# Patient Record
Sex: MALE | Race: WHITE | NOT HISPANIC OR LATINO | Employment: STUDENT | URBAN - METROPOLITAN AREA
[De-identification: names, ages, dates, MRNs, and addresses within clinical notes are randomized per-mention and may not be internally consistent; named-entity substitution may affect disease eponyms.]

---
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
## 2023-10-24 ENCOUNTER — HOSPITAL ENCOUNTER (EMERGENCY)
Facility: HOSPITAL | Age: 18
End: 2023-10-24
Attending: EMERGENCY MEDICINE
Payer: COMMERCIAL

## 2023-10-24 ENCOUNTER — TELEPHONE (OUTPATIENT)
Age: 18
End: 2023-10-24

## 2023-10-24 VITALS
OXYGEN SATURATION: 99 % | DIASTOLIC BLOOD PRESSURE: 69 MMHG | TEMPERATURE: 98 F | BODY MASS INDEX: 22.5 KG/M2 | WEIGHT: 152.34 LBS | HEART RATE: 74 BPM | RESPIRATION RATE: 16 BRPM | SYSTOLIC BLOOD PRESSURE: 122 MMHG

## 2023-10-24 DIAGNOSIS — F32.A DEPRESSION: Primary | ICD-10-CM

## 2023-10-24 LAB
ALBUMIN SERPL BCP-MCNC: 4.9 G/DL (ref 3.5–5)
ALP SERPL-CCNC: 71 U/L (ref 34–104)
ALT SERPL W P-5'-P-CCNC: 9 U/L (ref 7–52)
AMPHETAMINES SERPL QL SCN: NEGATIVE
ANION GAP SERPL CALCULATED.3IONS-SCNC: 7 MMOL/L
AST SERPL W P-5'-P-CCNC: 16 U/L (ref 13–39)
BARBITURATES UR QL: NEGATIVE
BASOPHILS # BLD AUTO: 0.04 THOUSANDS/ÂΜL (ref 0–0.1)
BASOPHILS NFR BLD AUTO: 1 % (ref 0–1)
BENZODIAZ UR QL: POSITIVE
BILIRUB SERPL-MCNC: 1.16 MG/DL (ref 0.2–1)
BUN SERPL-MCNC: 20 MG/DL (ref 5–25)
CALCIUM SERPL-MCNC: 9.8 MG/DL (ref 8.4–10.2)
CHLORIDE SERPL-SCNC: 101 MMOL/L (ref 96–108)
CO2 SERPL-SCNC: 28 MMOL/L (ref 21–32)
COCAINE UR QL: NEGATIVE
CREAT SERPL-MCNC: 0.98 MG/DL (ref 0.6–1.3)
EOSINOPHIL # BLD AUTO: 0.01 THOUSAND/ÂΜL (ref 0–0.61)
EOSINOPHIL NFR BLD AUTO: 0 % (ref 0–6)
ERYTHROCYTE [DISTWIDTH] IN BLOOD BY AUTOMATED COUNT: 13.2 % (ref 11.6–15.1)
ETHANOL EXG-MCNC: 0 MG/DL
GFR SERPL CREATININE-BSD FRML MDRD: 111 ML/MIN/1.73SQ M
GLUCOSE SERPL-MCNC: 108 MG/DL (ref 65–140)
HCT VFR BLD AUTO: 43.7 % (ref 36.5–49.3)
HGB BLD-MCNC: 14.8 G/DL (ref 12–17)
IMM GRANULOCYTES # BLD AUTO: 0.02 THOUSAND/UL (ref 0–0.2)
IMM GRANULOCYTES NFR BLD AUTO: 0 % (ref 0–2)
LYMPHOCYTES # BLD AUTO: 1.75 THOUSANDS/ÂΜL (ref 0.6–4.47)
LYMPHOCYTES NFR BLD AUTO: 22 % (ref 14–44)
MCH RBC QN AUTO: 28.7 PG (ref 26.8–34.3)
MCHC RBC AUTO-ENTMCNC: 33.9 G/DL (ref 31.4–37.4)
MCV RBC AUTO: 85 FL (ref 82–98)
METHADONE UR QL: NEGATIVE
MONOCYTES # BLD AUTO: 0.29 THOUSAND/ÂΜL (ref 0.17–1.22)
MONOCYTES NFR BLD AUTO: 4 % (ref 4–12)
NEUTROPHILS # BLD AUTO: 5.92 THOUSANDS/ÂΜL (ref 1.85–7.62)
NEUTS SEG NFR BLD AUTO: 73 % (ref 43–75)
NRBC BLD AUTO-RTO: 0 /100 WBCS
OPIATES UR QL SCN: NEGATIVE
OXYCODONE+OXYMORPHONE UR QL SCN: NEGATIVE
PCP UR QL: NEGATIVE
PLATELET # BLD AUTO: 266 THOUSANDS/UL (ref 149–390)
PMV BLD AUTO: 9.7 FL (ref 8.9–12.7)
POTASSIUM SERPL-SCNC: 4 MMOL/L (ref 3.5–5.3)
PROT SERPL-MCNC: 7.8 G/DL (ref 6.4–8.4)
RBC # BLD AUTO: 5.15 MILLION/UL (ref 3.88–5.62)
SODIUM SERPL-SCNC: 136 MMOL/L (ref 135–147)
T4 FREE SERPL-MCNC: 0.84 NG/DL (ref 0.61–1.12)
THC UR QL: POSITIVE
TSH SERPL DL<=0.05 MIU/L-ACNC: 0.23 UIU/ML (ref 0.45–4.5)
WBC # BLD AUTO: 8.03 THOUSAND/UL (ref 4.31–10.16)

## 2023-10-24 PROCEDURE — 80307 DRUG TEST PRSMV CHEM ANLYZR: CPT

## 2023-10-24 PROCEDURE — 99283 EMERGENCY DEPT VISIT LOW MDM: CPT

## 2023-10-24 PROCEDURE — 84443 ASSAY THYROID STIM HORMONE: CPT

## 2023-10-24 PROCEDURE — 85025 COMPLETE CBC W/AUTO DIFF WBC: CPT

## 2023-10-24 PROCEDURE — 80053 COMPREHEN METABOLIC PANEL: CPT

## 2023-10-24 PROCEDURE — 36415 COLL VENOUS BLD VENIPUNCTURE: CPT

## 2023-10-24 PROCEDURE — 99285 EMERGENCY DEPT VISIT HI MDM: CPT | Performed by: EMERGENCY MEDICINE

## 2023-10-24 PROCEDURE — 82075 ASSAY OF BREATH ETHANOL: CPT

## 2023-10-24 PROCEDURE — 84439 ASSAY OF FREE THYROXINE: CPT

## 2023-10-24 NOTE — ED NOTES
Pt's family requested pt's car key be taken home to move his car.  Knight given back to family     Cuca Thacker, RN  10/24/23 5064

## 2023-10-24 NOTE — ED NOTES
Patient is accepted at [facility]  Patient is accepted by Dr. Whitney Lanier per  [address] is arranged with [employer]. Transportation is scheduled for 20:45 with [facility]  Patient may go to the floor at upon arrival        Nurse report is to be called to [phone] prior to patient transfer.

## 2023-10-24 NOTE — EMTALA/ACUTE CARE TRANSFER
[address] [facility] [address]  Dept: [phone]      EMTALA TRANSFER CONSENT    NAME Meet Lnad                                         2005                              MRN [id number]    I have been informed of my rights regarding examination, treatment, and transfer   by Dr. Salima Hill MD    Benefits: Specialized equipment and/or services available at the receiving facility (Include comment)________________________ (Inpatient Pyschiatry)    Risks: Potential for delay in receiving treatment      Consent for Transfer:  I acknowledge that my medical condition has been evaluated and explained to me by the emergency department physician or other qualified medical person and/or my attending physician, who has recommended that I be transferred to the service of  Accepting Physician: Keyshawn Osman at [address] [place] [address] [address] Name, [address] [place] [address] Street : [address] [facility]. The above potential benefits of such transfer, the potential risks associated with such transfer, and the probable risks of not being transferred have been explained to me, and I fully understand them. The doctor has explained that, in my case, the benefits of transfer outweigh the risks. I agree to be transferred. I authorize the performance of emergency medical procedures and treatments upon me in both transit and upon arrival at the receiving facility. Additionally, I authorize the release of any and all medical records to the receiving facility and request they be transported with me, if possible. I understand that the safest mode of transportation during a medical emergency is an ambulance and that the Hospital advocates the use of this mode of transport.  Risks of traveling to the receiving facility by car, including absence of medical control, life sustaining equipment, such as oxygen, and medical personnel has been explained to me and I fully understand them. (KENNY CORRECT BOX BELOW)  [X]  I consent to the stated transfer and to be transported by ambulance/helicopter. [  ]  I consent to the stated transfer, but refuse transportation by ambulance and accept full responsibility for my transportation by car. I understand the risks of non-ambulance transfers and I exonerate the Hospital and its staff from any deterioration in my condition that results from this refusal.    X___________________________________________    DATE  10/24/23  TIME________  Signature of patient or legally responsible individual signing on patient behalf           RELATIONSHIP TO PATIENT_________________________          Provider Certification    NAME Ronald Hull                                         2005                              MRN [id number]    A medical screening exam was performed on the above named patient. Based on the examination:    Condition Necessitating Transfer The encounter diagnosis was Depression.     Patient Condition: The patient has been stabilized such that within reasonable medical probability, no material deterioration of the patient condition or the condition of the unborn child(estefany) is likely to result from the transfer    Reason for Transfer: Level of Care needed not available at this facility    Transfer Requirements: [address] [facility] [address] [facility] available and qualified personnel available for treatment as acknowledged by    Agreed to accept transfer and to provide appropriate medical treatment as acknowledged by       Rojelio Krishnamurthy  Appropriate medical records of the examination and treatment of the patient are provided at the time of transfer   [address] _______  Transfer will be performed by qualified personnel from    and appropriate transfer equipment as required, including the use of necessary and appropriate life support measures. Provider Certification: I have examined the patient and explained the following risks and benefits of being transferred/refusing transfer to the patient/family:  General risk, such as traffic hazards, adverse weather conditions, rough terrain or turbulence, possible failure of equipment (including vehicle or aircraft), or consequences of actions of persons outside the control of the transport personnel, The patient is stable for psychiatric transfer because they are medically stable, and is protected from harming him/herself or others during transport      Based on these reasonable risks and benefits to the patient and/or the unborn child(estefany), and based upon the information available at the time of the patient’s examination, I certify that the medical benefits reasonably to be expected from the provision of appropriate medical treatments at another medical facility outweigh the increasing risks, if any, to the individual’s medical condition, and in the case of labor to the unborn child, from effecting the transfer.     X____________________________________________ DATE 10/24/23        TIME_______      ORIGINAL - SEND TO MEDICAL RECORDS   COPY - SEND WITH PATIENT DURING TRANSFER

## 2023-10-24 NOTE — ED PROVIDER NOTES
History  Chief Complaint   Patient presents with    Psychiatric Evaluation     Has been experiencing anxiety, depression, SI. Has been having difficulty with same for years. States, "I just want to be happy again." Recent weight loss, with poor appetite. HPI    Prior to Admission Medications   Prescriptions Last Dose Informant Patient Reported? Taking?   bismuth subsalicylate (PEPTO BISMOL) 262 MG chewable tablet   No No   Sig: Chew 2 tablets (524 mg total) 4 (four) times a day (before meals and at bedtime)   omeprazole (PriLOSEC) 40 MG capsule  Self No No   Sig: Take 1 capsule (40 mg total) by mouth daily   Patient not taking: Reported on 3/29/2022    pantoprazole (PROTONIX) 40 mg tablet  Self No No   Sig: Take 1 tablet (40 mg total) by mouth daily   pantoprazole (PROTONIX) 40 mg tablet   No No   Sig: Take 1 tablet (40 mg total) by mouth 2 (two) times a day Please take Pantoprazole twice daily during 14 day treatment      Facility-Administered Medications: None       Past Medical History:   Diagnosis Date    GERD (gastroesophageal reflux disease)     Known health problems: none        Past Surgical History:   Procedure Laterality Date    NO PAST SURGERIES      TOOTH EXTRACTION      UPPER GASTROINTESTINAL ENDOSCOPY         Family History   Problem Relation Age of Onset    Hypertension Family     Diabetes Family     Cancer Family     No Known Problems Mother     No Known Problems Father      I have reviewed and agree with the history as documented.     E-Cigarette/Vaping    E-Cigarette Use Never User      E-Cigarette/Vaping Substances    Nicotine No     THC No     CBD No     Flavoring No     Other No     Unknown No      Social History     Tobacco Use    Smoking status: Never    Smokeless tobacco: Never   Vaping Use    Vaping Use: Never used   Substance Use Topics    Alcohol use: Not Currently    Drug use: No       Review of Systems    Physical Exam  Physical Exam    Vital Signs  ED Triage Vitals [10/24/23 0956] Temperature Pulse Respirations Blood Pressure SpO2   98 °F (36.7 °C) 72 18 137/71 99 %      Temp Source Heart Rate Source Patient Position - Orthostatic VS BP Location FiO2 (%)   Oral Monitor Sitting Right arm --      Pain Score       --           Vitals:    10/24/23 0956   BP: 137/71   Pulse: 72   Patient Position - Orthostatic VS: Sitting         Visual Acuity      ED Medications  Medications - No data to display    Diagnostic Studies  Results Reviewed       Procedure Component Value Units Date/Time    POCT alcohol breath test [[id number]]     Lab Status: No result     Rapid drug screen, urine [[id number]]     Lab Status: No result Specimen: Urine                    No orders to display              Procedures  Procedures         ED Course                                             Medical Decision Making  Amount and/or Complexity of Data Reviewed  Labs: ordered. Disposition  Final diagnoses:   None     ED Disposition       None          Follow-up Information    None         Patient's Medications   Discharge Prescriptions    No medications on file       No discharge procedures on file.     PDMP Review       None            ED Provider  Electronically Signed by

## 2023-10-24 NOTE — ED NOTES
Pt mother requesting blood work to be done "in case it is anything physical".  Resident made aware     Aminata Curran  10/24/23 4348

## 2023-10-24 NOTE — TELEPHONE ENCOUNTER
With the symptoms pt is having, Dr Kurtis Man advised to go to ER and we fill follow up with him afterwards.  Pt not seen in 3 years but Dr Kurtis Man will see him going forward

## 2023-10-24 NOTE — ED NOTES
Insurance Authorization for admission:       Patient has [employer] is secondary    [place] F from [employer] advised that [employer] will complete pre cert and COB      Phone number: **.     Spoke to **.     ** days approved. Level of care: **.  Review on **. Authorization # **. Eligibility Verification System checked - [phone]). Online system / automated system indicates: **    Insurance Authorization for Transportation:    Phone call placed to **. Phone number **. Spoke to **.    Authorization #: **

## 2023-10-24 NOTE — ED ATTENDING ATTESTATION
10/24/2023  IKael MD, saw and evaluated the patient. I have discussed the patient with the resident/non-physician practitioner and agree with the resident's/non-physician practitioner's findings, Plan of Care, and MDM as documented in the resident's/non-physician practitioner's note, except where noted. All available labs and Radiology studies were reviewed. I was present for key portions of any procedure(s) performed by the resident/non-physician practitioner and I was immediately available to provide assistance. At this point I agree with the current assessment done in the Emergency Department. I have conducted an independent evaluation of this patient a history and physical is as follows:      20 yo male with h/o GERD p/w anhedonia, difficulty concentrating, poor appetitie, insomnia,and depression. Denies thoughts of self harm or gesture. NO AH/VH. Denies ETOH abuse or street drugs. Does have family h/o mental illness in uncle and feels maybe family is not super supportive of getting help for mental illness. NO previous psychiatric admissions. NO other systemic complaints. Pt states symptoms began after caught shoplifting several months ago. Pt was afraid of being expelled from school but ultimately got disciplinary probation. On exam pt with flat affect, neuro intact, unremarkable HEENT and thyroid. Normal heart/lungs. Mom requested screening labs to r/o organic cause. TSH mildly low but otherwise unremakrable. Pt seen by ED crisis and signed 61 51 81 for placement. HD stable throughout. ED Course  ED Course as of 10/24/23 1653   Tue Oct 24, 2023   1206 Rapid drug screen, urine(!)  Benzo and THC otherwise   1230 CBC and differential  No significant leukocytosis reassuring diff, normal H/H, normal platelets. 1249 Comprehensive metabolic panel(!)  Reassuring, no end organ damage, no AG, normal bicarb. 56 Medically cleared for psychiatric treatment.     1254 TSH 3RD KIARA(!): 0.235  Minimal low TSH, will check free T4         Critical Care Time  Procedures

## 2023-10-24 NOTE — ED PROVIDER NOTES
History  Chief Complaint   Patient presents with    Psychiatric Evaluation     Has been experiencing anxiety, depression, SI. Has been having difficulty with same for years. States, "I just want to be happy again." Recent weight loss, with poor appetite. Mr. Analia Arora is a 73-year-old male with a past medical history of GERD who presents to the ED with anxiety, depression, and passive SI. Patient reports for the past 2 months he has had decreased interest and pleasure in doing his normal activities, difficulties focusing, passive suicidal ideation, insomnia, issues with impulsivity, and overall sad and guilty feeling. Patient denies any self-harm, visual or auditory hallucinations, or any actual plan to commit suicide. Patient also mentions that within the last 2 months he stole something from a retail store and received a ticket for it. Reports that he is unsure why he did so, but he has had increased anxiety since then due to concerns that his college will expel him. Prior to Admission Medications   Prescriptions Last Dose Informant Patient Reported?  Taking?   bismuth subsalicylate (PEPTO BISMOL) 262 MG chewable tablet   No No   Sig: Chew 2 tablets (524 mg total) 4 (four) times a day (before meals and at bedtime)   omeprazole (PriLOSEC) 40 MG capsule  Self No No   Sig: Take 1 capsule (40 mg total) by mouth daily   Patient not taking: Reported on 3/29/2022    pantoprazole (PROTONIX) 40 mg tablet  Self No No   Sig: Take 1 tablet (40 mg total) by mouth daily   pantoprazole (PROTONIX) 40 mg tablet   No No   Sig: Take 1 tablet (40 mg total) by mouth 2 (two) times a day Please take Pantoprazole twice daily during 14 day treatment      Facility-Administered Medications: None       Past Medical History:   Diagnosis Date    GERD (gastroesophageal reflux disease)     Known health problems: none        Past Surgical History:   Procedure Laterality Date    NO PAST SURGERIES      TOOTH EXTRACTION UPPER GASTROINTESTINAL ENDOSCOPY         Family History   Problem Relation Age of Onset    Hypertension Family     Diabetes Family     Cancer Family     No Known Problems Mother     No Known Problems Father      I have reviewed and agree with the history as documented. E-Cigarette/Vaping    E-Cigarette Use Never User      E-Cigarette/Vaping Substances    Nicotine No     THC No     CBD No     Flavoring No     Other No     Unknown No      Social History     Tobacco Use    Smoking status: Never    Smokeless tobacco: Never   Vaping Use    Vaping Use: Never used   Substance Use Topics    Alcohol use: Not Currently    Drug use: No        Review of Systems   Constitutional:  Negative for activity change, chills and fever. Eyes:  Negative for pain and visual disturbance. Respiratory:  Negative for chest tightness and shortness of breath. Cardiovascular:  Negative for chest pain. Gastrointestinal:  Negative for abdominal pain, constipation, diarrhea, nausea and vomiting. Genitourinary:  Negative for dysuria and hematuria. Skin:  Negative for rash and wound. Neurological:  Negative for dizziness, syncope, light-headedness and headaches. Psychiatric/Behavioral:  Positive for behavioral problems, decreased concentration, dysphoric mood, sleep disturbance and suicidal ideas. Negative for hallucinations and self-injury. The patient is nervous/anxious. Physical Exam  ED Triage Vitals [10/24/23 0956]   Temperature Pulse Respirations Blood Pressure SpO2   98 °F (36.7 °C) 72 18 137/71 99 %      Temp Source Heart Rate Source Patient Position - Orthostatic VS BP Location FiO2 (%)   Oral Monitor Sitting Right arm --      Pain Score       --             Orthostatic Vital Signs  Vitals:    10/24/23 0956 10/24/23 1300   BP: 137/71 122/69   Pulse: 72 74   Patient Position - Orthostatic VS: Sitting Lying       Physical Exam  Vitals and nursing note reviewed. Constitutional:       Appearance: Normal appearance. HENT:      Head: Normocephalic and atraumatic. Right Ear: External ear normal.      Left Ear: External ear normal.      Nose: Nose normal.      Mouth/Throat:      Mouth: Mucous membranes are moist.      Pharynx: Oropharynx is clear. Eyes:      Extraocular Movements: Extraocular movements intact. Conjunctiva/sclera: Conjunctivae normal.   Cardiovascular:      Rate and Rhythm: Normal rate and regular rhythm. Heart sounds: Normal heart sounds. Pulmonary:      Effort: Pulmonary effort is normal.      Breath sounds: Normal breath sounds. Abdominal:      General: Abdomen is flat. There is no distension. Palpations: Abdomen is soft. Tenderness: There is no abdominal tenderness. There is no guarding or rebound. Hernia: No hernia is present. Musculoskeletal:         General: Normal range of motion. Cervical back: Normal range of motion and neck supple. Skin:     General: Skin is warm and dry. Neurological:      General: No focal deficit present. Mental Status: He is alert and oriented to person, place, and time. ED Medications  Medications - No data to display    Diagnostic Studies  Results Reviewed       Procedure Component Value Units Date/Time    TSH, 3rd generation with Free T4 reflex [[id number]]  (Abnormal) Collected: 10/24/23 1213    Lab Status: Final result Specimen: Blood from Arm, Left Updated: 10/24/23 1252     TSH 3RD GENERATON 0.235 uIU/mL     T4, free [[id number]] Collected: 10/24/23 1213    Lab Status:  In process Specimen: Blood from Arm, Left Updated: 10/24/23 1252    Comprehensive metabolic panel [[id number]]  (Abnormal) Collected: 10/24/23 1213    Lab Status: Final result Specimen: Blood from Arm, Left Updated: 10/24/23 1235     Sodium 136 mmol/L      Potassium 4.0 mmol/L      Chloride 101 mmol/L      CO2 28 mmol/L      ANION GAP 7 mmol/L      BUN 20 mg/dL      Creatinine 0.98 mg/dL      Glucose 108 mg/dL      Calcium 9.8 mg/dL      AST 16 U/L ALT 9 U/L      Alkaline Phosphatase 71 U/L      Total Protein 7.8 g/dL      Albumin 4.9 g/dL      Total Bilirubin 1.16 mg/dL      eGFR 111 ml/min/1.73sq m     Narrative:      [facility] (CKD):     Stage 1 with normal or high GFR (GFR > 90 mL/min/1.73 square meters)    Stage 2 Mild CKD (GFR = 60-89 mL/min/1.73 square meters)    Stage 3A Moderate CKD (GFR = 45-59 mL/min/1.73 square meters)    Stage 3B Moderate CKD (GFR = 30-44 mL/min/1.73 square meters)    Stage 4 Severe CKD (GFR = 15-29 mL/min/1.73 square meters)    Stage 5 End Stage CKD (GFR <15 mL/min/1.73 square meters)  Note: GFR calculation is accurate only with a steady state creatinine    CBC and differential [[id number]] Collected: 10/24/23 1213    Lab Status: Final result Specimen: Blood from Arm, Left Updated: 10/24/23 1228     WBC 8.03 Thousand/uL      RBC 5.15 Million/uL      Hemoglobin 14.8 g/dL      Hematocrit 43.7 %      MCV 85 fL      MCH 28.7 pg      MCHC 33.9 g/dL      RDW 13.2 %      MPV 9.7 fL      Platelets 511 Thousands/uL      nRBC 0 /100 WBCs      Neutrophils Relative 73 %      Immat GRANS % 0 %      Lymphocytes Relative 22 %      Monocytes Relative 4 %      Eosinophils Relative 0 %      Basophils Relative 1 %      Neutrophils Absolute 5.92 Thousands/µL      Immature Grans Absolute 0.02 Thousand/uL      Lymphocytes Absolute 1.75 Thousands/µL      Monocytes Absolute 0.29 Thousand/µL      Eosinophils Absolute 0.01 Thousand/µL      Basophils Absolute 0.04 Thousands/µL     Rapid drug screen, urine [[id number]]  (Abnormal) Collected: 10/24/23 1126    Lab Status: Final result Specimen: Urine, Clean Catch Updated: 10/24/23 1156     Amph/Meth UR Negative     Barbiturate Ur Negative     Benzodiazepine Urine Positive     Cocaine Urine Negative     Methadone Urine Negative     Opiate Urine Negative     PCP Ur Negative     THC Urine Positive     Oxycodone Urine Negative    Narrative: Presumptive report. If requested, specimen will be sent to reference lab for confirmation. FOR MEDICAL PURPOSES ONLY. IF CONFIRMATION NEEDED PLEASE CONTACT THE LAB WITHIN 5 DAYS. Drug Screen Cutoff Levels:  AMPHETAMINE/METHAMPHETAMINES  1000 ng/mL  BARBITURATES     200 ng/mL  BENZODIAZEPINES     200 ng/mL  COCAINE      300 ng/mL  METHADONE      300 ng/mL  OPIATES      300 ng/mL  PHENCYCLIDINE     25 ng/mL  THC       50 ng/mL  OXYCODONE      100 ng/mL    POCT alcohol breath test [[id number]]  (Normal) Resulted: 10/24/23 1045    Lab Status: Final result Updated: 10/24/23 1045     EXTBreath Alcohol 0.000                   No orders to display         Procedures  Procedures      ED Course         CRAFFT      Flowsheet Row Most Recent Value   CRAFFT Initial Screen: During the past 12 months, did you:    1. Drink any alcohol (more than a few sips)? No Filed at: 10/24/2023 1050   2. Smoke any marijuana or hashish No Filed at: 10/24/2023 1050   3. Use anything else to get high? ("anything else" includes illegal drugs, over the counter and prescription drugs, and things that you sniff or 'walton')? No Filed at: 10/24/2023 1050                                      Medical Decision Making  Mr. Shubham Shabazz is a 55-year-old male with a past medical history of GERD who presents to the ED with anxiety, depression, and passive SI.    UDS: Positive for benzos and THC  Breath alcohol: Negative  Labs significant for low TSH, at the time of signout. Patient is medically cleared and crisis consult has been placed and is pending at the time of sign out. Patient signed out to Dr. Francis Palacios exclusive care. Amount and/or Complexity of Data Reviewed  Labs: ordered. Disposition  Final diagnoses:   None     ED Disposition       None          MD Sonia Fischer Most Recent Value   Sending MD Daniella Gomez M.D.           Follow-up Information    None         Patient's Medications   Discharge Prescriptions No medications on file     No discharge procedures on file. PDMP Review       None             ED Provider  Attending physically available and evaluated Reginald Manual. I managed the patient along with the ED Attending.     Electronically Signed by           Blaine Richey MD  10/24/23 9779

## 2023-10-24 NOTE — ED NOTES
Per mother, patient has [employer]. She does not have a card, but has information in the car. Advised her to get the information and provide it to Registration. TAWANNA Weathers) appears to be secondary. Referral was faxed to [facility].   Crisis to follow-up with insurance details and referral.

## 2023-10-24 NOTE — TELEPHONE ENCOUNTER
I called the office and spoke with the nurse who spoke with dr Keegan Whelan and the patient was advised to go to the er and to follow up and reestablish with dr Charbel Guillermo spoke with the mom and she is taking the patient to the er and will call to schedule a follow up appointment

## 2023-10-24 NOTE — ED NOTES
The patient is a 47-year-old male who arrived to the emergency department with family. His cooperative and alert. He is oriented x 4. The patient indicates that he has been having difficulty regulating his emotions. 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 He is currently a sophomore. The patient indicates that he rents an apartment where he lives alone with 2 other housemates in the lower levels. He reports that he feels safe and can return. He denies any abuse. The patient indicates that while on break from school he works as a  at [employer]. The patient did indicate that he has been smoking marijuana and using delta 8. He has also been using nicotine either in the form of a vapor or cigarettes. He reports very rare social drinking. The patient indicated that he has been having difficulties lately with high anxiety episodes where he feels like he is "freaking out". He reports that he previously was the type of person who loved life and loved his family and friends and was always up for some activity. He reports that he felt invincible and felt that he was at the top of his game. However, more recently he has been feeling very down and describes an empty feeling in the pit of his stomach. He reports feeling dark and feeling physical pain in his abdomen with occasional loss of feeling in his legs. He feels like he is "freaking out" often and begins sweating, imagining bad things, and reports that his mind goes to "the worst places". He reports he is not able to sleep well. He estimates 3 to 4 hours of broken sleep per night for the last month. He reports that at times he becomes overwhelmed with depressive feelings and reports tearfulness, loss of interest, energy, and motivation. He reports that he feels extremely desperate to find himself again and she feels the way he once did. He reports passive death wish.   He reports no appetite. He reports that he has lost 10 pounds in the last month. The patient denies any overt suicidal ideas or plan to this crisis worker, but conveyed to the nurse that he was having thoughts previously of wrecking his car. The patient denied any past suicide attempts and denied any self-injurious behavior. He denied any homicidal ideas, plan, or intent, now or in the past.  The patient denied any past inpatient psychiatric admissions. He denied any past psychiatric medications. He only recently approached the school guidance counselor at [facility] and did feel better momentarily, but reported that he had intense unpleasant feelings thereafter. He reached out to family and was subsequently brought to the emergency department for evaluation. The patient does describe a history of somewhat manic presentation where he would steal for the thrill of it. September 7, 2023 he was arrested for stevenson theft and ordered to pay restitution. He did not confide this to his parents, which was particularly concerning because his father and uncle are both attorneys and would have been able to help. They also would have been able to help pay the restitution but he did not alert them. He reports that shortly after the arrest he received a letter from the school indicating that he violated the code of conduct. They assured him that with good behavior there would be no repercussions, but he admits that this is caused an immediate increase in his anxiety with concerns that he may be expelled. He reports that he has not been able to find relief from this and constantly feels on guard and worried something bad may happen. The patient endorses use of marijuana and delta 8 and this may be a contributing factor. His mother indicated that he tends to be very driven and competitive. He tends to compare himself to others and feels inferior if he does not meet his goals.   He has discussed quitting school recently and has been rather indecisive and requires much assurance from others. Discussed a voluntary admission for psychiatric treatment. The patient was agreeable. 201 was completed. The patient was provided with rights and an explanation of the 72-hour notice. The patient's mother did require much support and she is very upset about his current situation, hoping very much that he will improve. There is reported family history of depression and anxiety but no known bipolar disorder. Inpatient recommended. Referral will be sent to [facility].

## 2024-01-08 ENCOUNTER — TELEPHONE (OUTPATIENT)
Dept: GASTROENTEROLOGY | Facility: CLINIC | Age: 19
End: 2024-01-08

## 2024-01-08 ENCOUNTER — APPOINTMENT (EMERGENCY)
Dept: RADIOLOGY | Facility: HOSPITAL | Age: 19
End: 2024-01-08
Payer: COMMERCIAL

## 2024-01-08 ENCOUNTER — CONSULT (OUTPATIENT)
Dept: GASTROENTEROLOGY | Facility: CLINIC | Age: 19
End: 2024-01-08
Payer: COMMERCIAL

## 2024-01-08 ENCOUNTER — HOSPITAL ENCOUNTER (EMERGENCY)
Facility: HOSPITAL | Age: 19
Discharge: HOME | End: 2024-01-08
Attending: EMERGENCY MEDICINE
Payer: COMMERCIAL

## 2024-01-08 VITALS
BODY MASS INDEX: 22.85 KG/M2 | SYSTOLIC BLOOD PRESSURE: 124 MMHG | TEMPERATURE: 97.1 F | WEIGHT: 159.6 LBS | HEIGHT: 70 IN | DIASTOLIC BLOOD PRESSURE: 98 MMHG

## 2024-01-08 VITALS
RESPIRATION RATE: 16 BRPM | TEMPERATURE: 97.4 F | HEART RATE: 70 BPM | SYSTOLIC BLOOD PRESSURE: 124 MMHG | DIASTOLIC BLOOD PRESSURE: 57 MMHG | OXYGEN SATURATION: 97 %

## 2024-01-08 DIAGNOSIS — R11.2 NAUSEA AND VOMITING, UNSPECIFIED VOMITING TYPE: Primary | ICD-10-CM

## 2024-01-08 DIAGNOSIS — A04.8 H. PYLORI INFECTION: ICD-10-CM

## 2024-01-08 DIAGNOSIS — R11.2 NAUSEA AND VOMITING: ICD-10-CM

## 2024-01-08 DIAGNOSIS — R07.9 ACUTE CHEST PAIN: ICD-10-CM

## 2024-01-08 DIAGNOSIS — R17 ELEVATED BILIRUBIN: ICD-10-CM

## 2024-01-08 DIAGNOSIS — R10.9 ACUTE ABDOMINAL PAIN: Primary | ICD-10-CM

## 2024-01-08 DIAGNOSIS — R10.9 ACUTE ABDOMINAL PAIN: ICD-10-CM

## 2024-01-08 LAB
ALBUMIN SERPL BCP-MCNC: 5.1 G/DL (ref 3.5–5)
ALP SERPL-CCNC: 58 U/L (ref 34–104)
ALT SERPL W P-5'-P-CCNC: 6 U/L (ref 7–52)
ANION GAP SERPL CALCULATED.3IONS-SCNC: 9 MMOL/L
AST SERPL W P-5'-P-CCNC: 17 U/L (ref 13–39)
BASOPHILS # BLD AUTO: 0.03 THOUSANDS/ÂΜL (ref 0–0.1)
BASOPHILS NFR BLD AUTO: 0 % (ref 0–1)
BILIRUB SERPL-MCNC: 1.34 MG/DL (ref 0.2–1)
BUN SERPL-MCNC: 20 MG/DL (ref 5–25)
CALCIUM SERPL-MCNC: 10 MG/DL (ref 8.4–10.2)
CARDIAC TROPONIN I PNL SERPL HS: <2 NG/L
CHLORIDE SERPL-SCNC: 101 MMOL/L (ref 96–108)
CO2 SERPL-SCNC: 28 MMOL/L (ref 21–32)
CREAT SERPL-MCNC: 1.07 MG/DL (ref 0.6–1.3)
EOSINOPHIL # BLD AUTO: 0.02 THOUSAND/ÂΜL (ref 0–0.61)
EOSINOPHIL NFR BLD AUTO: 0 % (ref 0–6)
ERYTHROCYTE [DISTWIDTH] IN BLOOD BY AUTOMATED COUNT: 12.9 % (ref 11.6–15.1)
GFR SERPL CREATININE-BSD FRML MDRD: 99 ML/MIN/1.73SQ M
GLUCOSE SERPL-MCNC: 97 MG/DL (ref 65–140)
HCT VFR BLD AUTO: 41.9 % (ref 36.5–49.3)
HGB BLD-MCNC: 14.4 G/DL (ref 12–17)
IMM GRANULOCYTES # BLD AUTO: 0.03 THOUSAND/UL (ref 0–0.2)
IMM GRANULOCYTES NFR BLD AUTO: 0 % (ref 0–2)
LIPASE SERPL-CCNC: 30 U/L (ref 11–82)
LYMPHOCYTES # BLD AUTO: 1.31 THOUSANDS/ÂΜL (ref 0.6–4.47)
LYMPHOCYTES NFR BLD AUTO: 14 % (ref 14–44)
MCH RBC QN AUTO: 29.1 PG (ref 26.8–34.3)
MCHC RBC AUTO-ENTMCNC: 34.4 G/DL (ref 31.4–37.4)
MCV RBC AUTO: 85 FL (ref 82–98)
MONOCYTES # BLD AUTO: 0.29 THOUSAND/ÂΜL (ref 0.17–1.22)
MONOCYTES NFR BLD AUTO: 3 % (ref 4–12)
NEUTROPHILS # BLD AUTO: 7.71 THOUSANDS/ÂΜL (ref 1.85–7.62)
NEUTS SEG NFR BLD AUTO: 83 % (ref 43–75)
NRBC BLD AUTO-RTO: 0 /100 WBCS
PLATELET # BLD AUTO: 221 THOUSANDS/UL (ref 149–390)
PMV BLD AUTO: 10 FL (ref 8.9–12.7)
POTASSIUM SERPL-SCNC: 3.8 MMOL/L (ref 3.5–5.3)
PROT SERPL-MCNC: 7.8 G/DL (ref 6.4–8.4)
RBC # BLD AUTO: 4.95 MILLION/UL (ref 3.88–5.62)
SODIUM SERPL-SCNC: 138 MMOL/L (ref 135–147)
WBC # BLD AUTO: 9.39 THOUSAND/UL (ref 4.31–10.16)

## 2024-01-08 PROCEDURE — 99285 EMERGENCY DEPT VISIT HI MDM: CPT | Performed by: EMERGENCY MEDICINE

## 2024-01-08 PROCEDURE — 36415 COLL VENOUS BLD VENIPUNCTURE: CPT | Performed by: EMERGENCY MEDICINE

## 2024-01-08 PROCEDURE — 85025 COMPLETE CBC W/AUTO DIFF WBC: CPT | Performed by: EMERGENCY MEDICINE

## 2024-01-08 PROCEDURE — C9113 INJ PANTOPRAZOLE SODIUM, VIA: HCPCS | Performed by: EMERGENCY MEDICINE

## 2024-01-08 PROCEDURE — 84484 ASSAY OF TROPONIN QUANT: CPT | Performed by: EMERGENCY MEDICINE

## 2024-01-08 PROCEDURE — 83690 ASSAY OF LIPASE: CPT | Performed by: EMERGENCY MEDICINE

## 2024-01-08 PROCEDURE — 96375 TX/PRO/DX INJ NEW DRUG ADDON: CPT

## 2024-01-08 PROCEDURE — 99214 OFFICE O/P EST MOD 30 MIN: CPT | Performed by: INTERNAL MEDICINE

## 2024-01-08 PROCEDURE — 93005 ELECTROCARDIOGRAM TRACING: CPT

## 2024-01-08 PROCEDURE — 99283 EMERGENCY DEPT VISIT LOW MDM: CPT

## 2024-01-08 PROCEDURE — 96374 THER/PROPH/DIAG INJ IV PUSH: CPT

## 2024-01-08 PROCEDURE — 96361 HYDRATE IV INFUSION ADD-ON: CPT

## 2024-01-08 PROCEDURE — 71046 X-RAY EXAM CHEST 2 VIEWS: CPT

## 2024-01-08 PROCEDURE — 80053 COMPREHEN METABOLIC PANEL: CPT | Performed by: EMERGENCY MEDICINE

## 2024-01-08 RX ORDER — SUCRALFATE 1 G/1
1 TABLET ORAL ONCE
Status: COMPLETED | OUTPATIENT
Start: 2024-01-08 | End: 2024-01-08

## 2024-01-08 RX ORDER — METOCLOPRAMIDE HYDROCHLORIDE 5 MG/ML
10 INJECTION INTRAMUSCULAR; INTRAVENOUS ONCE
Status: COMPLETED | OUTPATIENT
Start: 2024-01-08 | End: 2024-01-08

## 2024-01-08 RX ORDER — ONDANSETRON 4 MG/1
4 TABLET, FILM COATED ORAL EVERY 8 HOURS PRN
Qty: 12 TABLET | Refills: 0 | Status: SHIPPED | OUTPATIENT
Start: 2024-01-08

## 2024-01-08 RX ORDER — PANTOPRAZOLE SODIUM 40 MG/10ML
40 INJECTION, POWDER, LYOPHILIZED, FOR SOLUTION INTRAVENOUS ONCE
Status: COMPLETED | OUTPATIENT
Start: 2024-01-08 | End: 2024-01-08

## 2024-01-08 RX ORDER — SUCRALFATE 1 G/1
1 TABLET ORAL 4 TIMES DAILY PRN
Qty: 12 TABLET | Refills: 0 | Status: SHIPPED | OUTPATIENT
Start: 2024-01-08

## 2024-01-08 RX ORDER — OMEPRAZOLE 20 MG/1
20 CAPSULE, DELAYED RELEASE ORAL DAILY
Qty: 7 CAPSULE | Refills: 0 | Status: SHIPPED | OUTPATIENT
Start: 2024-01-08 | End: 2024-01-08

## 2024-01-08 RX ADMIN — SUCRALFATE 1 G: 1 TABLET ORAL at 10:39

## 2024-01-08 RX ADMIN — SODIUM CHLORIDE 1000 ML: 0.9 INJECTION, SOLUTION INTRAVENOUS at 10:42

## 2024-01-08 RX ADMIN — PANTOPRAZOLE SODIUM 40 MG: 40 INJECTION, POWDER, FOR SOLUTION INTRAVENOUS at 10:40

## 2024-01-08 RX ADMIN — METOCLOPRAMIDE 10 MG: 5 INJECTION, SOLUTION INTRAMUSCULAR; INTRAVENOUS at 12:10

## 2024-01-08 NOTE — PROGRESS NOTES
[facility] - Outpatient Consultation  Julio Lorenzo 18 y.o. male MRN: [id number]  Encounter: [id number]          ASSESSMENT AND PLAN:      1. Acute abdominal pain  2.  Nausea and vomiting  3.  Acute diarrhea  4.  Marijuana use  5.  Weight loss  6.  Elevated total bilirubin    Patient has episodic upper GI symptoms.  This is likely related to marijuana use.  Encourage patient to stop using marijuana.  He is also at risk of peptic ulcer disease given history of H. pylori infection which was partially treated and could be exacerbating his symptoms.  Patient agreeable to get EGD done to investigate further.  Procedure ordered.  Continue pantoprazole for now.  Has slightly elevated bilirubin.  Will investigate this with hepatic function panel and screening test for hepatitis B and C.  Further management plan based on investigation results.    RTC 4 months.      - Ambulatory Referral to Gastroenterology  - EGD; Future  - Clostridium difficile Toxin/GDH W/Refl To PCR; Future  - Stool culture; Future  - Hepatic function panel; Future  - Hepatitis B surface antigen; Future  - Hepatitis C antibody; Future  - Hepatitis B surface antibody; Future  - Hepatitis B core antibody, total; Future      ______________________________________________________________________    HPI: 18-year-old with H. pylori gastritis presents for evaluation.    Patient was in the ED earlier today with abdominal pain along with nausea and vomiting.  Labs were done including CBC, liver enzymes and renal function which were reviewed by me today and were normal except for slightly elevated bilirubin.  Patient had celiac panel done in 2020 which was negative.  On review of previous record I found patient was diagnosed with H. pylori back in 2022 and was prescribed quadruple therapy which patient could not finish.    Patient reports that he has bouts of nausea, vomiting along with abdominal pain intermittently.  In between these  episodes he is asymptomatic.  His latest episode started 2 weeks ago.  He is also had loose stools for the last 1 week.  Prior to this his bowel movements were normal.  He is lost about 10 pounds in the last 1 year.  No family history of colon cancer.  Denies intake of NSAIDs.  Occasional alcohol intake.  Smokes marijuana and cigarettes.      REVIEW OF SYSTEMS:    CONSTITUTIONAL: Denies any fever, chills, rigors, and weight loss.  HEENT: No earache or tinnitus. Denies hearing loss or visual disturbances.  CARDIOVASCULAR: No chest pain or palpitations.   RESPIRATORY: Denies any cough, hemoptysis, shortness of breath or dyspnea on exertion.  GASTROINTESTINAL: As noted in the History of Present Illness.   GENITOURINARY: No problems with urination. Denies any hematuria or dysuria.  NEUROLOGIC: No dizziness or vertigo, denies headaches.   MUSCULOSKELETAL: Denies any muscle or joint pain.   SKIN: Denies skin rashes or itching.   ENDOCRINE: Denies excessive thirst. Denies intolerance to heat or cold.  PSYCHOSOCIAL: Denies depression or anxiety. Denies any recent memory loss.       Historical Information   Past Medical History:   Diagnosis Date    GERD (gastroesophageal reflux disease)     Known health problems: none      Past Surgical History:   Procedure Laterality Date    NO PAST SURGERIES      TOOTH EXTRACTION      UPPER GASTROINTESTINAL ENDOSCOPY       Social History   Social History     Substance and Sexual Activity   Alcohol Use Not Currently     Social History     Substance and Sexual Activity   Drug Use No     Social History     Tobacco Use   Smoking Status Never   Smokeless Tobacco Never     Family History   Problem Relation Age of Onset    Hypertension Family     Diabetes Family     Cancer Family     No Known Problems Mother     No Known Problems Father        Meds/Allergies       Current Outpatient Medications:     bismuth subsalicylate (PEPTO BISMOL) 262 MG chewable tablet    omeprazole (PriLOSEC) 40 MG  "capsule    ondansetron (ZOFRAN) 4 mg tablet    pantoprazole (PROTONIX) 40 mg tablet    pantoprazole (PROTONIX) 40 mg tablet    sucralfate (CARAFATE) 1 g tablet  No current facility-administered medications for this visit.    No Known Allergies        Objective     Blood pressure 124/98, temperature (!) 97.1 °F (36.2 °C), temperature source Tympanic, height 5' 9.5\" (1.765 m), weight 72.4 kg (159 lb 9.6 oz). Body mass index is 23.23 kg/m².        PHYSICAL EXAM:      General Appearance:   Alert, cooperative, no distress   HEENT:   Normocephalic, atraumatic, anicteric.     Neck:  Supple, symmetrical, trachea midline   Lungs:   Clear to auscultation bilaterally; no rales, rhonchi or wheezing; respirations unlabored    Heart::   Regular rate and rhythm; no murmur, rub, or gallop.   Abdomen:   Soft, non-tender, non-distended; normal bowel sounds; no masses, no organomegaly    Genitalia:   Deferred    Rectal:   Deferred    Extremities:  No cyanosis, clubbing or edema    Pulses:  2+ and symmetric    Skin:  No jaundice, rashes, or lesions    Lymph nodes:  No palpable cervical lymphadenopathy        Lab Results:   Admission on 01/08/2024, Discharged on 01/08/2024   Component Date Value    WBC 01/08/2024 9.39     RBC 01/08/2024 4.95     Hemoglobin 01/08/2024 14.4     Hematocrit 01/08/2024 41.9     MCV 01/08/2024 85     MCH 01/08/2024 29.1     MCHC 01/08/2024 34.4     RDW 01/08/2024 12.9     MPV 01/08/2024 10.0     Platelets 01/08/2024 221     nRBC 01/08/2024 0     Neutrophils Relative 01/08/2024 83 (H)     Immat GRANS % 01/08/2024 0     Lymphocytes Relative 01/08/2024 14     Monocytes Relative 01/08/2024 3 (L)     Eosinophils Relative 01/08/2024 0     Basophils Relative 01/08/2024 0     Neutrophils Absolute 01/08/2024 7.71 (H)     Immature Grans Absolute 01/08/2024 0.03     Lymphocytes Absolute 01/08/2024 1.31     Monocytes Absolute 01/08/2024 0.29     Eosinophils Absolute 01/08/2024 0.02     Basophils Absolute 01/08/2024 0.03  "    Sodium 01/08/2024 138     Potassium 01/08/2024 3.8     Chloride 01/08/2024 101     CO2 01/08/2024 28     ANION GAP 01/08/2024 9     BUN 01/08/2024 20     Creatinine 01/08/2024 1.07     Glucose 01/08/2024 97     Calcium 01/08/2024 10.0     AST 01/08/2024 17     ALT 01/08/2024 6 (L)     Alkaline Phosphatase 01/08/2024 58     Total Protein 01/08/2024 7.8     Albumin 01/08/2024 5.1 (H)     Total Bilirubin 01/08/2024 1.34 (H)     eGFR 01/08/2024 99     Lipase 01/08/2024 30     hs TnI 0hr 01/08/2024 <2     Ventricular Rate 01/08/2024 63     Atrial Rate 01/08/2024 63     FL Interval 01/08/2024 152     QRSD Interval 01/08/2024 104     QT Interval 01/08/2024 446     QTC Interval 01/08/2024 456     P Axis 01/08/2024 76     QRS Sidney 01/08/2024 88     T Wave Sidney 01/08/2024 48          Radiology Results:   XR chest 2 views    Result Date: 1/8/2024  Narrative: CHEST INDICATION:   Frequent vomiting. COMPARISON:  None EXAM PERFORMED/VIEWS:  XR CHEST PA & LATERAL 2 views FINDINGS: Cardiomediastinal silhouette appears unremarkable. The lungs are clear.  No pneumothorax or pleural effusion. Osseous structures appear within normal limits for patient age.     Impression: No acute cardiopulmonary disease. Workstation performed: [id number]

## 2024-01-08 NOTE — ED PROVIDER NOTES
History  Chief Complaint   Patient presents with    Vomiting     Patient reports vomiting, unable to keep any to keep anything down. Similar episode 2 years ago, had egd done and dx with h. Pylori. Patient treated with abx but states vomiting seems to never have gone away but vomiting is worse now.     Patient is a 18-year-old male, with a history significant for H. pylori gastritis, GERD, recent ED evaluation for anxiety/depression, who presents to the ED today with a 2-year history of atraumatic, nonradiating, worsening over the last 1 to 2 weeks, constant epigastric abdominal pain is characterized as a sore/burning sensation.  There is associated nausea and vomiting: Patient states that yesterday he had an episode of vomiting with specks of blood in it but this has not recurred.  He denies blood in stool, dyspnea, urinary symptoms, weakness, numbness.  Patient also complains of a multiple day history of constant chest pain that does not radiate and is nonexertional/nonpleuritic as well.  Per patient's mother, present in room and helping to provide history, there is no family history of early unexplained or early cardiac death in the family.  Eating exacerbates his symptoms.  Patient has not take anything to remit his symptoms.  He also reports decreased p.o. intake.  Per my review of the medical record, patient had an EGD performed in February 2022 that was essentially normal.  Patient is without other concerns at this time.        Prior to Admission Medications   Prescriptions Last Dose Informant Patient Reported? Taking?   bismuth subsalicylate (PEPTO BISMOL) 262 MG chewable tablet   No No   Sig: Chew 2 tablets (524 mg total) 4 (four) times a day (before meals and at bedtime)   Patient not taking: Reported on 1/8/2024   omeprazole (PriLOSEC) 40 MG capsule  Self No No   Sig: Take 1 capsule (40 mg total) by mouth daily   Patient not taking: Reported on 3/29/2022   pantoprazole (PROTONIX) 40 mg tablet  Self No No    Sig: Take 1 tablet (40 mg total) by mouth daily   pantoprazole (PROTONIX) 40 mg tablet   No No   Sig: Take 1 tablet (40 mg total) by mouth 2 (two) times a day Please take Pantoprazole twice daily during 14 day treatment      Facility-Administered Medications: None       Past Medical History:   Diagnosis Date    GERD (gastroesophageal reflux disease)     Known health problems: none        Past Surgical History:   Procedure Laterality Date    NO PAST SURGERIES      TOOTH EXTRACTION      UPPER GASTROINTESTINAL ENDOSCOPY         Family History   Problem Relation Age of Onset    Hypertension Family     Diabetes Family     Cancer Family     No Known Problems Mother     No Known Problems Father      I have reviewed and agree with the history as documented.    E-Cigarette/Vaping    E-Cigarette Use Never User      E-Cigarette/Vaping Substances    Nicotine No     THC No     CBD No     Flavoring No     Other No     Unknown No      Social History     Tobacco Use    Smoking status: Never    Smokeless tobacco: Never   Vaping Use    Vaping status: Never Used   Substance Use Topics    Alcohol use: Not Currently    Drug use: No       Review of Systems   Constitutional:  Negative for fever.   HENT:  Positive for trouble swallowing (Chronic).    Eyes:  Negative for visual disturbance.   Respiratory:  Negative for shortness of breath.    Cardiovascular:  Positive for chest pain.   Gastrointestinal:  Positive for abdominal pain, nausea and vomiting.   Endocrine: Negative for polyuria.   Genitourinary:  Negative for dysuria.   Musculoskeletal:  Negative for gait problem.   Skin:  Negative for rash.   Allergic/Immunologic: Negative for environmental allergies.   Neurological:  Negative for weakness and numbness.   Hematological:  Negative for adenopathy.   Psychiatric/Behavioral:  Negative for confusion.    All other systems reviewed and are negative.      Physical Exam  Physical Exam  Vitals and nursing note reviewed.   Constitutional:        General: He is not in acute distress.     Appearance: He is not ill-appearing, toxic-appearing or diaphoretic.      Comments: Patient appears comfortable during my evaluation    HENT:      Head: Normocephalic and atraumatic.      Right Ear: External ear normal.      Left Ear: External ear normal.      Nose: Nose normal. No rhinorrhea.      Mouth/Throat:      Mouth: Mucous membranes are moist.      Pharynx: Oropharynx is clear. No oropharyngeal exudate or posterior oropharyngeal erythema.      Comments: Uvula midline. No oropharyngeal or submandibular mass/swelling  Eyes:      General: No scleral icterus.        Right eye: No discharge.         Left eye: No discharge.      Conjunctiva/sclera: Conjunctivae normal.      Pupils: Pupils are equal, round, and reactive to light.   Neck:      Comments: Patient is spontaneously rotating their neck to the left and right during the history and physical exam interaction without difficulty or apparent discomfort    Cardiovascular:      Rate and Rhythm: Normal rate and regular rhythm.      Pulses: Normal pulses.      Heart sounds: Normal heart sounds. No murmur heard.     No friction rub. No gallop.      Comments: 2+ Radial  Pulmonary:      Effort: Pulmonary effort is normal. No respiratory distress.      Breath sounds: Normal breath sounds. No stridor. No wheezing, rhonchi or rales.      Comments: No crepitus with palpation of the chest wall  Abdominal:      General: Abdomen is flat. There is no distension.      Palpations: Abdomen is soft.      Tenderness: There is abdominal tenderness (Epigastric). There is no right CVA tenderness, left CVA tenderness, guarding or rebound.      Comments: Negative Bernard sign, negative McBurney point   Musculoskeletal:         General: No tenderness (No pain with calf squeeze) or deformity.      Cervical back: Neck supple. No tenderness. No muscular tenderness.      Right lower leg: No edema.      Left lower leg: No edema.    Lymphadenopathy:      Cervical: No cervical adenopathy.   Skin:     General: Skin is warm and dry.      Capillary Refill: Capillary refill takes less than 2 seconds.   Neurological:      Mental Status: He is alert.      Comments: Patient is speaking clearly in complete sentences.  Patient is answering appropriately and able follow commands.  Patient is moving all four extremities spontaneously.  No facial droop.  Tongue midline.      Psychiatric:         Mood and Affect: Mood normal.         Behavior: Behavior normal.      Comments: Anxious affect         Vital Signs  ED Triage Vitals [01/08/24 1007]   Temperature Pulse Respirations Blood Pressure SpO2   (!) 97.4 °F (36.3 °C) 59 18 125/71 97 %      Temp Source Heart Rate Source Patient Position - Orthostatic VS BP Location FiO2 (%)   Oral Monitor Lying Right arm --      Pain Score       --           Vitals:    01/08/24 1007 01/08/24 1212   BP: 125/71 124/57   Pulse: 59 70   Patient Position - Orthostatic VS: Lying Sitting         Visual Acuity      ED Medications  Medications   sodium chloride 0.9 % bolus 1,000 mL (0 mL Intravenous Stopped 1/8/24 1213)   pantoprazole (PROTONIX) injection 40 mg (40 mg Intravenous Given 1/8/24 1040)   sucralfate (CARAFATE) tablet 1 g (1 g Oral Given 1/8/24 1039)   metoclopramide (REGLAN) injection 10 mg (10 mg Intravenous Given 1/8/24 1210)       Diagnostic Studies  Results Reviewed       Procedure Component Value Units Date/Time    HS Troponin 0hr (reflex protocol) [[id number]]  (Normal) Collected: 01/08/24 1059    Lab Status: Final result Specimen: Blood from Arm, Left Updated: 01/08/24 1126     hs TnI 0hr <2 ng/L     Comprehensive metabolic panel [[id number]]  (Abnormal) Collected: 01/08/24 1039    Lab Status: Final result Specimen: Blood from Arm, Left Updated: 01/08/24 1102     Sodium 138 mmol/L      Potassium 3.8 mmol/L      Chloride 101 mmol/L      CO2 28 mmol/L      ANION GAP 9 mmol/L      BUN 20 mg/dL      Creatinine 1.07  mg/dL      Glucose 97 mg/dL      Calcium 10.0 mg/dL      AST 17 U/L      ALT 6 U/L      Alkaline Phosphatase 58 U/L      Total Protein 7.8 g/dL      Albumin 5.1 g/dL      Total Bilirubin 1.34 mg/dL      eGFR 99 ml/min/1.73sq m     Narrative:      National Kid[employer] Foundation guidelines for Chronic Kidney Disease (CKD):     Stage 1 with normal or high GFR (GFR > 90 mL/min/1.73 square meters)    Stage 2 Mild CKD (GFR = 60-89 mL/min/1.73 square meters)    Stage 3A Moderate CKD (GFR = 45-59 mL/min/1.73 square meters)    Stage 3B Moderate CKD (GFR = 30-44 mL/min/1.73 square meters)    Stage 4 Severe CKD (GFR = 15-29 mL/min/1.73 square meters)    Stage 5 End Stage CKD (GFR <15 mL/min/1.73 square meters)  Note: GFR calculation is accurate only with a steady state creatinine    Lipase [[id number]]  (Normal) Collected: 01/08/24 1039    Lab Status: Final result Specimen: Blood from Arm, Left Updated: 01/08/24 1102     Lipase 30 u/L     CBC and differential [[id number]]  (Abnormal) Collected: 01/08/24 1039    Lab Status: Final result Specimen: Blood from Arm, Left Updated: 01/08/24 1046     WBC 9.39 Thousand/uL      RBC 4.95 Million/uL      Hemoglobin 14.4 g/dL      Hematocrit 41.9 %      MCV 85 fL      MCH 29.1 pg      MCHC 34.4 g/dL      RDW 12.9 %      MPV 10.0 fL      Platelets 221 Thousands/uL      nRBC 0 /100 WBCs      Neutrophils Relative 83 %      Immat GRANS % 0 %      Lymphocytes Relative 14 %      Monocytes Relative 3 %      Eosinophils Relative 0 %      Basophils Relative 0 %      Neutrophils Absolute 7.71 Thousands/µL      Immature Grans Absolute 0.03 Thousand/uL      Lymphocytes Absolute 1.31 Thousands/µL      Monocytes Absolute 0.29 Thousand/µL      Eosinophils Absolute 0.02 Thousand/µL      Basophils Absolute 0.03 Thousands/µL                    XR chest 2 views   ED Interpretation by J Luis Luna MD (01/08 4698)   Per my independent interpretation: No acute cardiopulmonary process, no abnormal  cutaneous air      Final Result by Oleksandr Francis MD (01/08 1140)      No acute cardiopulmonary disease.                  Workstation performed: [id number]                    Procedures  Procedures         ED Course  ED Course as of 01/08/24 1521   Mon Jan 08, 2024   1101 Patient reports improvement in symptoms at this time   1105 ECG per my independent interpretation: Normal rate, regular rhythm, no ectopy, normal axis, no ST elevations or depressions.  Normal ECG   1147 hs TnI 0hr: <2  WNL -as symptoms have been present for greater than 3 hours, no need for delta             HEART Risk Score      Flowsheet Row Most Recent Value   Heart Score Risk Calculator    History 0 Filed at: 01/08/2024 1151   ECG 0 Filed at: 01/08/2024 1151   Age 0 Filed at: 01/08/2024 1151   Risk Factors 1 Filed at: 01/08/2024 1151   Troponin 0 Filed at: 01/08/2024 1151   HEART Score 1 Filed at: 01/08/2024 1151                  PERC Rule for PE      Flowsheet Row Most Recent Value   PERC Rule for PE    Age >=50 0 Filed at: 01/08/2024 1151   HR >=100 0 Filed at: 01/08/2024 1151   O2 Sat on room air < 95% 0 Filed at: 01/08/2024 1151   History of PE or DVT 0 Filed at: 01/08/2024 1151   Recent trauma or surgery 0 Filed at: 01/08/2024 1151   Hemoptysis 0 Filed at: 01/08/2024 1151   Exogenous estrogen 0 Filed at: 01/08/2024 1151   Unilateral leg swelling 0 Filed at: 01/08/2024 1151   PERC Rule for PE Results 0 Filed at: 01/08/2024 1151                              Medical Decision Making  Patient is a 18-year-old male, with a history significant for H. pylori gastritis, GERD, recent ED evaluation for anxiety/depression, who presents to the ED today with a 2-year history of atraumatic, nonradiating, worsening over the last 1 to 2 weeks, constant epigastric abdominal pain is characterized as a sore/burning sensation.  There is associated nausea and vomiting: Patient states that yesterday he had an episode of vomiting with specks of blood in it  but this has not recurred.  He denies blood in stool, dyspnea, urinary symptoms, weakness, numbness.  Patient also complains of a multiple day history of constant chest pain that does not radiate and is nonexertional/nonpleuritic as well.  Per patient's mother, present in room and helping to provide history, there is no family history of early unexplained or early cardiac death in the family.  Eating exacerbates his symptoms.  Patient has not take anything to remit his symptoms.  He also reports decreased p.o. intake.  Per my review of the medical record, patient had an EGD performed in February 2022 that was essentially normal.  Patient is currently afebrile and hemodynamically stable.  His physical exam is notable for subjective epigastric tenderness without rebound or guarding, clear heart and lungs.  This presentation is concerning for: Anxiety, gastritis.  I also considered ACS, Katie-Strong tear, electrolyte abnormality, pancreatitis.  No reason to suspect biliary pathology, Boerhaave syndrome, appendicitis at this time based on history physical exam.  No reason to suspect pulmonary embolism based upon PERC and Wells scores of 0.  Will investigate with cardiac workup, lipase.  Will manage with Carafate, Zofran, fluids, further based upon workup.    Amount and/or Complexity of Data Reviewed  Labs: ordered. Decision-making details documented in ED Course.  Radiology: ordered.    Risk  Prescription drug management.             Disposition  Final diagnoses:   Acute abdominal pain   Nausea and vomiting   Acute chest pain   Elevated bilirubin     Time reflects when diagnosis was documented in both MDM as applicable and the Disposition within this note       Time User Action Codes Description Comment    1/8/2024 11:50 AM J Luis Luna Add [R10.9] Acute abdominal pain     1/8/2024 11:50 AM J Luis Luna Add [R11.2] Nausea and vomiting     1/8/2024 11:50 AM J Luis Luna Add [R07.9] Acute chest  pain     1/8/2024 11:51 AM J Luis Luna Add [R17] Elevated bilirubin           ED Disposition       ED Disposition   Discharge    Condition   Stable    Date/Time   Mon Jan 8, 2024 1152    Comment   Julio Lorenzo discharge to home/self care.                   Follow-up Information       Follow up With Specialties Details Why Contact Info Additional Information    Rosamaria Ohara MD Family Medicine Schedule an appointment as soon as possible for a visit   [address] [place] [address] [address]  [facility] [address]  [phone]       [facility] [facility] Schedule an appointment as soon as possible for a visit   [address]  [address]  [facility] [id number] [phone] [facility] [place], [address], [place], [place], [address] [phone]            Discharge Medication List as of 1/8/2024 12:02 PM        START taking these medications    Details   ondansetron (ZOFRAN) 4 mg tablet Take 1 tablet (4 mg total) by mouth every 8 (eight) hours as needed for nausea, Starting Mon 1/8/2024, Normal      sucralfate (CARAFATE) 1 g tablet Take 1 tablet (1 g total) by mouth 4 (four) times a day as needed (Abdominal pain) for up to 12 doses, Starting Mon 1/8/2024, Normal           CONTINUE these medications which have NOT CHANGED    Details   bismuth subsalicylate (PEPTO BISMOL) 262 MG chewable tablet Chew 2 tablets (524 mg total) 4 (four) times a day (before meals and at bedtime), Starting Tue 3/29/2022, Normal      omeprazole (PriLOSEC) 40 MG capsule Take 1 capsule (40 mg total) by mouth daily, Starting Thu 10/1/2020, Normal      !! pantoprazole (PROTONIX) 40 mg tablet Take 1 tablet (40 mg total) by mouth daily, Starting Wed 2/16/2022, Normal      !! pantoprazole (PROTONIX) 40 mg tablet Take 1 tablet (40 mg total) by mouth 2 (two) times a day Please take Pantoprazole twice daily during 14 day treatment, Starting Tue  3/29/2022, Normal       !! - Potential duplicate medications found. Please discuss with provider.              PDMP Review       None            ED Provider  Electronically Signed by             J Luis Luna MD  01/08/24 4238

## 2024-01-08 NOTE — PATIENT INSTRUCTIONS
Scheduled date of EGD(as of today): 01/17/2024  Physician performing EGD: Dr. Brown   Location of EGD: [facility]  Instructions reviewed with patient by: Carrie LANTIGUA   Clearances:  N/A

## 2024-01-08 NOTE — DISCHARGE INSTRUCTIONS
You were evaluated in the emergency department for: abdominal pain. You can access your current and pending results through [facility]'s [employer]. A radiologist will take a second look at your X-Rays, if you had any, and you will be contacted with any new findings.     You should follow-up with your primary care provider, as soon as possible, for re-evaluation.  If you do not have a primary care provider, I have referred you to [facility]. You will be contacted about scheduling an appointment. Their phone number is also included on this paperwork.  You have also been referred to gastroenterology and you should follow-up with them as well.    Your workup revealed no emergent features at this time; however, many disease processes are dynamic:    Please, return to the emergency department if you experience new or worsening symptoms, fever, chest pain, shortness of breath, difficulty breathing, dizziness, abdominal pain, persistent nausea/vomiting, syncope or passing out, blood in your urine or stool, coughing up blood, leg swelling/pain, urinary retention, bowel or bladder incontinence, numbness between your legs.    Additionally, your blood pressure was measured to be high. This is something that you should discuss with your primary care provider and have re-checked within one week.

## 2024-01-09 ENCOUNTER — ANESTHESIA (OUTPATIENT)
Dept: ANESTHESIOLOGY | Facility: HOSPITAL | Age: 19
End: 2024-01-09

## 2024-01-09 ENCOUNTER — ANESTHESIA EVENT (OUTPATIENT)
Dept: ANESTHESIOLOGY | Facility: HOSPITAL | Age: 19
End: 2024-01-09

## 2024-01-09 LAB
ATRIAL RATE: 63 BPM
P AXIS: 76 DEGREES
PR INTERVAL: 152 MS
QRS AXIS: 88 DEGREES
QRSD INTERVAL: 104 MS
QT INTERVAL: 446 MS
QTC INTERVAL: 456 MS
T WAVE AXIS: 48 DEGREES
VENTRICULAR RATE: 63 BPM

## 2024-01-16 RX ORDER — SODIUM CHLORIDE 9 MG/ML
125 INJECTION, SOLUTION INTRAVENOUS CONTINUOUS
Status: CANCELLED | OUTPATIENT
Start: 2024-01-16

## 2024-01-17 ENCOUNTER — HOSPITAL ENCOUNTER (OUTPATIENT)
Dept: GASTROENTEROLOGY | Facility: MEDICAL CENTER | Age: 19
Setting detail: OUTPATIENT SURGERY
Discharge: HOME | End: 2024-01-17
Payer: COMMERCIAL

## 2024-01-17 ENCOUNTER — ANESTHESIA EVENT (OUTPATIENT)
Dept: GASTROENTEROLOGY | Facility: MEDICAL CENTER | Age: 19
End: 2024-01-17

## 2024-01-17 ENCOUNTER — ANESTHESIA (OUTPATIENT)
Dept: GASTROENTEROLOGY | Facility: MEDICAL CENTER | Age: 19
End: 2024-01-17

## 2024-01-17 VITALS
DIASTOLIC BLOOD PRESSURE: 86 MMHG | RESPIRATION RATE: 15 BRPM | HEART RATE: 66 BPM | OXYGEN SATURATION: 100 % | SYSTOLIC BLOOD PRESSURE: 123 MMHG | TEMPERATURE: 97.3 F

## 2024-01-17 DIAGNOSIS — R17 ELEVATED BILIRUBIN: ICD-10-CM

## 2024-01-17 DIAGNOSIS — R10.9 ACUTE ABDOMINAL PAIN: ICD-10-CM

## 2024-01-17 DIAGNOSIS — R11.2 NAUSEA AND VOMITING, UNSPECIFIED VOMITING TYPE: ICD-10-CM

## 2024-01-17 DIAGNOSIS — A04.8 H. PYLORI INFECTION: ICD-10-CM

## 2024-01-17 PROCEDURE — 43239 EGD BIOPSY SINGLE/MULTIPLE: CPT | Performed by: INTERNAL MEDICINE

## 2024-01-17 PROCEDURE — 88305 TISSUE EXAM BY PATHOLOGIST: CPT | Performed by: PATHOLOGY

## 2024-01-17 RX ORDER — LIDOCAINE HYDROCHLORIDE 20 MG/ML
INJECTION, SOLUTION EPIDURAL; INFILTRATION; INTRACAUDAL; PERINEURAL AS NEEDED
Status: DISCONTINUED | OUTPATIENT
Start: 2024-01-17 | End: 2024-01-17

## 2024-01-17 RX ORDER — PROPOFOL 10 MG/ML
INJECTION, EMULSION INTRAVENOUS CONTINUOUS PRN
Status: DISCONTINUED | OUTPATIENT
Start: 2024-01-17 | End: 2024-01-17

## 2024-01-17 RX ORDER — PROPOFOL 10 MG/ML
INJECTION, EMULSION INTRAVENOUS AS NEEDED
Status: DISCONTINUED | OUTPATIENT
Start: 2024-01-17 | End: 2024-01-17

## 2024-01-17 RX ORDER — SODIUM CHLORIDE 9 MG/ML
125 INJECTION, SOLUTION INTRAVENOUS CONTINUOUS
Status: DISCONTINUED | OUTPATIENT
Start: 2024-01-17 | End: 2024-01-21 | Stop reason: HOSPADM

## 2024-01-17 RX ORDER — MIDAZOLAM HYDROCHLORIDE 2 MG/2ML
INJECTION, SOLUTION INTRAMUSCULAR; INTRAVENOUS AS NEEDED
Status: DISCONTINUED | OUTPATIENT
Start: 2024-01-17 | End: 2024-01-17

## 2024-01-17 RX ADMIN — MIDAZOLAM 2 MG: 1 INJECTION INTRAMUSCULAR; INTRAVENOUS at 12:13

## 2024-01-17 RX ADMIN — PROPOFOL 160 MG: 10 INJECTION, EMULSION INTRAVENOUS at 12:13

## 2024-01-17 RX ADMIN — LIDOCAINE HYDROCHLORIDE 100 MG: 20 INJECTION, SOLUTION EPIDURAL; INFILTRATION; INTRACAUDAL at 12:13

## 2024-01-17 RX ADMIN — PROPOFOL 190 MCG/KG/MIN: 10 INJECTION, EMULSION INTRAVENOUS at 12:13

## 2024-01-17 RX ADMIN — SODIUM CHLORIDE 125 ML/HR: 0.9 INJECTION, SOLUTION INTRAVENOUS at 11:58

## 2024-01-17 NOTE — ANESTHESIA PREPROCEDURE EVALUATION
Procedure:  EGD    Relevant Problems   No relevant active problems        Physical Exam    Airway    Mallampati score: I  TM Distance: >3 FB  Neck ROM: full     Dental       Cardiovascular  Cardiovascular exam normal    Pulmonary  Pulmonary exam normal     Other Findings        Anesthesia Plan  ASA Score- 2     Anesthesia Type- IV sedation with anesthesia with ASA Monitors.         Additional Monitors:     Airway Plan:            Plan Factors-Exercise tolerance (METS): >4 METS.    Chart reviewed.    Patient summary reviewed.    Patient is not a current smoker.      Obstructive sleep apnea risk education given perioperatively.        Induction- intravenous.    Postoperative Plan-     Informed Consent- Anesthetic plan and risks discussed with patient.

## 2024-01-17 NOTE — ANESTHESIA POSTPROCEDURE EVALUATION
Post-Op Assessment Note    CV Status:  Stable    Pain management: adequate       Mental Status:  Alert and awake   Hydration Status:  Euvolemic   PONV Controlled:  Controlled   Airway Patency:  Patent     Post Op Vitals Reviewed: Yes      Staff: Anesthesiologist               BP      Temp      Pulse     Resp      SpO2      /57   Pulse 61   Temp (!) 97.3 °F (36.3 °C) (Temporal)   Resp 15   SpO2 100%     
Performed

## 2024-01-17 NOTE — H&P
History and Physical -  [facility]  Julio Lorenzo 18 y.o. male MRN: [id number]                  HPI: Julio Lorenzo is a 18 y.o. year old male who presents for EGD to investigate acute abdominal pain, nausea and vomiting.      REVIEW OF SYSTEMS: Per the HPI, and otherwise unremarkable.    Historical Information   Past Medical History:   Diagnosis Date    GERD (gastroesophageal reflux disease)     Known health problems: none      Past Surgical History:   Procedure Laterality Date    NO PAST SURGERIES      TOOTH EXTRACTION      UPPER GASTROINTESTINAL ENDOSCOPY       Social History   Social History     Substance and Sexual Activity   Alcohol Use Yes    Comment: wine     Social History     Substance and Sexual Activity   Drug Use No     Social History     Tobacco Use   Smoking Status Never   Smokeless Tobacco Never     Family History   Problem Relation Age of Onset    Hypertension Family     Diabetes Family     Cancer Family     No Known Problems Mother     No Known Problems Father        Meds/Allergies     (Not in a hospital admission)      No Known Allergies    Objective     Blood pressure 122/74, pulse 70, temperature (!) 97.3 °F (36.3 °C), temperature source Temporal, resp. rate 18, SpO2 100%.      PHYSICAL EXAM    Gen: NAD  CV: RRR  CHEST: Clear  ABD: soft, NT/ND  EXT: no edema      ASSESSMENT/PLAN:  Julio Lorenzo is a 18 y.o. year old male who presents for EGD to investigate acute abdominal pain, nausea and vomiting. The patient is stable and optimized for the procedure, we reviewed risk and benefits. Risk include but not limited to infection, bleeding, perforation and missing a lesion.

## 2024-01-18 PROCEDURE — 88305 TISSUE EXAM BY PATHOLOGIST: CPT | Performed by: PATHOLOGY

## 2024-01-21 NOTE — RESULT ENCOUNTER NOTE
Dear staff: Please kindly communicate with patient that biopsy taken from stomach did not show any significant disease.  Good news.  The next step would be to get ultrasound to check if you have any stones in the gallbladder as well as get gastric emptying study if the ultrasound does not show any findings.  The gastric emptying study will check for any delay in emptying of the food which can also cause your symptoms.  Please let me know if you are okay with the orders and I can place them.  Thank you.

## 2024-01-22 DIAGNOSIS — R11.2 NAUSEA AND VOMITING, UNSPECIFIED VOMITING TYPE: ICD-10-CM

## 2024-01-22 DIAGNOSIS — R10.9 ACUTE ABDOMINAL PAIN: Primary | ICD-10-CM

## 2024-05-17 ENCOUNTER — TELEPHONE (OUTPATIENT)
Dept: GASTROENTEROLOGY | Facility: CLINIC | Age: 19
End: 2024-05-17

## 2024-05-17 NOTE — TELEPHONE ENCOUNTER
Called patient to remind him of multiple blood tests ordered at last appointment that needs to be completed before next appointment on 05/23/24

## 2024-05-20 ENCOUNTER — LAB (OUTPATIENT)
Dept: LAB | Facility: AMBULARY SURGERY CENTER | Age: 19
End: 2024-05-20
Payer: COMMERCIAL

## 2024-05-20 DIAGNOSIS — R10.9 ACUTE ABDOMINAL PAIN: ICD-10-CM

## 2024-05-20 DIAGNOSIS — A04.8 H. PYLORI INFECTION: ICD-10-CM

## 2024-05-20 DIAGNOSIS — R17 ELEVATED BILIRUBIN: ICD-10-CM

## 2024-05-20 DIAGNOSIS — R11.2 NAUSEA AND VOMITING, UNSPECIFIED VOMITING TYPE: ICD-10-CM

## 2024-05-20 LAB
ALBUMIN SERPL BCP-MCNC: 4.8 G/DL (ref 3.5–5)
ALP SERPL-CCNC: 73 U/L (ref 34–104)
ALT SERPL W P-5'-P-CCNC: 6 U/L (ref 7–52)
AST SERPL W P-5'-P-CCNC: 19 U/L (ref 13–39)
BILIRUB DIRECT SERPL-MCNC: 0.17 MG/DL (ref 0–0.2)
BILIRUB SERPL-MCNC: 0.82 MG/DL (ref 0.2–1)
HBV CORE AB SER QL: NORMAL
HBV SURFACE AB SER-ACNC: 4.48 MIU/ML
HBV SURFACE AG SER QL: NORMAL
HCV AB SER QL: NORMAL
PROT SERPL-MCNC: 7.9 G/DL (ref 6.4–8.4)

## 2024-05-20 PROCEDURE — 87340 HEPATITIS B SURFACE AG IA: CPT

## 2024-05-20 PROCEDURE — 86704 HEP B CORE ANTIBODY TOTAL: CPT

## 2024-05-20 PROCEDURE — 36415 COLL VENOUS BLD VENIPUNCTURE: CPT

## 2024-05-20 PROCEDURE — 86706 HEP B SURFACE ANTIBODY: CPT

## 2024-05-20 PROCEDURE — 80076 HEPATIC FUNCTION PANEL: CPT

## 2024-05-20 PROCEDURE — 86803 HEPATITIS C AB TEST: CPT

## 2024-05-21 NOTE — RESULT ENCOUNTER NOTE
Dear staff: Please kindly let patient know that liver tests have completely normalized and hepatitis B test show no infection.  Good news.

## 2024-05-22 ENCOUNTER — LAB (OUTPATIENT)
Dept: LAB | Facility: AMBULARY SURGERY CENTER | Age: 19
End: 2024-05-22
Payer: COMMERCIAL

## 2024-05-22 DIAGNOSIS — R10.9 ACUTE ABDOMINAL PAIN: ICD-10-CM

## 2024-05-22 DIAGNOSIS — R11.2 NAUSEA AND VOMITING, UNSPECIFIED VOMITING TYPE: ICD-10-CM

## 2024-05-22 DIAGNOSIS — A04.8 H. PYLORI INFECTION: ICD-10-CM

## 2024-05-22 DIAGNOSIS — R17 ELEVATED BILIRUBIN: ICD-10-CM

## 2024-05-22 PROCEDURE — 87324 CLOSTRIDIUM AG IA: CPT | Performed by: INTERNAL MEDICINE

## 2024-05-22 PROCEDURE — 87449 NOS EACH ORGANISM AG IA: CPT

## 2024-05-22 PROCEDURE — 87505 NFCT AGENT DETECTION GI: CPT

## 2024-05-23 LAB
C COLI+JEJUNI TUF STL QL NAA+PROBE: NEGATIVE
EC STX1+STX2 GENES STL QL NAA+PROBE: NEGATIVE
SALMONELLA SP SPAO STL QL NAA+PROBE: NEGATIVE
SHIGELLA SP+EIEC IPAH STL QL NAA+PROBE: NEGATIVE

## 2024-05-26 ENCOUNTER — TELEPHONE (OUTPATIENT)
Dept: GASTROENTEROLOGY | Facility: CLINIC | Age: 19
End: 2024-05-26

## 2024-05-26 ENCOUNTER — TELEPHONE (OUTPATIENT)
Dept: OTHER | Facility: OTHER | Age: 19
End: 2024-05-26

## 2024-05-26 LAB — MISCELLANEOUS LAB TEST RESULT: NORMAL

## 2024-05-26 NOTE — TELEPHONE ENCOUNTER
Received message from lab notifying me of patient's C. difficile stool testing results.  Patient initially with discordant results as he was found to have positive GDH antigen and negative toxin.  However reflex PCR testing resulted back positive for toxin.  In the setting of ongoing symptoms, would favor treatment for active C. Difficile infection.  Attempted to call patient to inform him of results and determine need for oral vancomycin.  Unable to get in contact.  Voicemail left on machine.     Charity Saleh DO, PGY-4  [facility] Gastroenterology Fellow

## 2024-05-28 DIAGNOSIS — A49.8 CLOSTRIDIOIDES DIFFICILE INFECTION: Primary | ICD-10-CM

## 2024-05-28 RX ORDER — VANCOMYCIN HYDROCHLORIDE 125 MG/1
125 CAPSULE ORAL 4 TIMES DAILY
Qty: 56 CAPSULE | Refills: 0 | Status: SHIPPED | OUTPATIENT
Start: 2024-05-28 | End: 2024-06-11

## 2024-05-28 NOTE — TELEPHONE ENCOUNTER
"I called and spoke with patient mother s whom is on consent , we went over results   And advisement       Pts mother Candice wants to know buy leaving the stool sample , \"out\" for 24 hours would this have any effects on results     Please advise   "

## 2024-05-28 NOTE — RESULT ENCOUNTER NOTE
Dear staff: Please kindly communicate with patient that stool test show infection from C. difficile.  This can be easily treated with 2 weeks of antibiotic.  I prescribed vancomycin.  He can pick it up from the pharmacy.

## 2024-06-05 ENCOUNTER — OFFICE VISIT (OUTPATIENT)
Dept: GASTROENTEROLOGY | Facility: CLINIC | Age: 19
End: 2024-06-05
Payer: COMMERCIAL

## 2024-06-05 ENCOUNTER — PREP FOR PROCEDURE (OUTPATIENT)
Dept: GASTROENTEROLOGY | Facility: CLINIC | Age: 19
End: 2024-06-05

## 2024-06-05 VITALS
WEIGHT: 149 LBS | BODY MASS INDEX: 21.33 KG/M2 | DIASTOLIC BLOOD PRESSURE: 66 MMHG | SYSTOLIC BLOOD PRESSURE: 112 MMHG | HEIGHT: 70 IN | HEART RATE: 60 BPM

## 2024-06-05 DIAGNOSIS — R63.4 UNINTENTIONAL WEIGHT LOSS OF MORE THAN 10 POUNDS: Primary | ICD-10-CM

## 2024-06-05 DIAGNOSIS — A04.72 C. DIFFICILE DIARRHEA: ICD-10-CM

## 2024-06-05 DIAGNOSIS — K52.9 CHRONIC DIARRHEA: ICD-10-CM

## 2024-06-05 PROCEDURE — 99214 OFFICE O/P EST MOD 30 MIN: CPT | Performed by: PHYSICIAN ASSISTANT

## 2024-06-05 NOTE — PROGRESS NOTES
[facility] - Outpatient Follow-up Note  Julio Lorenzo 18 y.o. male MRN: [id number]  Encounter: [id number]    ASSESSMENT AND PLAN:    1. Unintentional weight loss of more than 10 pounds  2. Chronic diarrhea  Patient no longer with abdominal pain, nausea and vomiting.  Main concern today is looser stools and significant unintentional weight loss over the last 6 months (over 10 pounds).  No prior colonoscopy.  No hematochezia.  No nocturnal stools.  Patient had negative celiac testing in the past.    At this time I recommend patient undergo colonoscopy with TI intubation and pancolonic biopsies in about 6 weeks time after treatment of C. Difficile    - polyethylene glycol (GOLYTELY) 4000 mL solution; Take 4,000 mL by mouth once for 1 dose Take as directed by office instructions prior to colonoscopy.  Dispense: 4000 mL; Refill: 0  - Colonoscopy; Future      3. C. difficile diarrhea  Patient was prescribed vancomycin for treatment of C. difficile last week.  I educated patient and his mother on C. difficile.  I recommended he continue/complete vancomycin as prescribed.  We discussed the importance of hand hygiene.  I recommended patient do clean bathrooms.  We also discussed that he should wash his bed sheets and towels in hot water.  Patient and mother expressed understanding.      I obtained informed consent from the patient the risk/benefits/alternatives of the procedure/s were discussed with the patient.  Risks included, but not limited to, infection, bleeding, perforation, injury to organs in the abdomen, missed lesion and incomplete procedure were discussed.  Patient was agreeable and electronic signature was obtained.      Patient was instructed to call the office with any questions, concerns, new/ worsening/ persisting GI symptoms. Advised patient go to the ER with any severe or worsening abdominal pain, fevers/ chills, intractable N/V, chest pain, SOB, dizziness, lightheadedness,  feeling something stuck in esophagus that will not go down. Patient expressed understanding and is in agreement with treatment plan.     Will plan to follow up after diagnostic tests   __________________________________________________________    SUBJECTIVE:    Patient is new to me.  Patient with a past medical history of prior H. pylori gastritis presents to the office today for follow-up.  Patient was last seen in the office 1/8/2024 by Dr. Brown, previous office note was reviewed.  At last office visit it was recommended patient continue with pantoprazole, undergo EGD and blood testing as well as stool testing.  EGD was completed 1/17/2024, this was reviewed, this showed edematous and erythematous mucosa in the body of the stomach, otherwise normal esophagus and duodenum.  Biopsy was negative for H. pylori.    Patient's mother is present for visit today.   Patient reports feeling better overall. No new complaints today.   He denies issues with abdominal pain or nausea and vomiting since last visit..   Patient states his stools have been looser. He has ~ 3-4 soft BMs/ day at baseline.  No nocturnal stools.  He is taking Vancomycin antibiotic as prescribed. No longer on PPI.   Patient has lost 10 pounds since last office visit. He is not trying to lose weight.  Patient and his mother are concerned about his unintentional weight loss.  Since 2022 he has lost 13 pounds without trying.  Patient denies any current or recent fevers/ chills,  nausea, vomiting,  black or bloody stools, heartburn, acid reflux, dysphagia, odynophagia.   Denies chest pain, SOB      Review of Systems   Constitutional:  Positive for unexpected weight change (weight loss). Negative for chills and fever.   HENT:  Negative for ear pain and sore throat.    Eyes:  Negative for pain and visual disturbance.   Respiratory:  Negative for cough and shortness of breath.    Cardiovascular:  Negative for chest pain and palpitations.   Gastrointestinal:   Negative for abdominal pain and vomiting.   Genitourinary:  Negative for dysuria and hematuria.   Musculoskeletal:  Negative for arthralgias and back pain.   Skin:  Negative for color change and rash.   Neurological:  Negative for seizures and syncope.   All other systems reviewed and are negative.         Historical Information   Past Medical History:   Diagnosis Date    GERD (gastroesophageal reflux disease)     Known health problems: none      Past Surgical History:   Procedure Laterality Date    NO PAST SURGERIES      TOOTH EXTRACTION      UPPER GASTROINTESTINAL ENDOSCOPY       Social History   Social History     Substance and Sexual Activity   Alcohol Use Yes    Alcohol/week: 1.0 standard drink of alcohol    Types: 1 Cans of beer per week    Comment: wine     Social History     Substance and Sexual Activity   Drug Use No     Social History     Tobacco Use   Smoking Status Never   Smokeless Tobacco Never     Family History   Problem Relation Age of Onset    Hypertension Family     Diabetes Family     Cancer Family     No Known Problems Mother     No Known Problems Father        Meds/Allergies       Current Outpatient Medications:     vancomycin (VANCOCIN) 125 MG capsule    bismuth subsalicylate (PEPTO BISMOL) 262 MG chewable tablet    omeprazole (PriLOSEC) 40 MG capsule    ondansetron (ZOFRAN) 4 mg tablet    pantoprazole (PROTONIX) 40 mg tablet    sucralfate (CARAFATE) 1 g tablet    No Known Allergies        Objective     Wt Readings from Last 3 Encounters:   06/05/24 67.6 kg (149 lb)   01/08/24 72.4 kg (159 lb 9.6 oz)   10/24/23 69.1 kg (152 lb 5.4 oz) (45%, Z= -0.13)*     * Growth percentiles are based on [facility][note 3] (Boys, 2-20 Years) data.     Temp Readings from Last 3 Encounters:   01/17/24 (!) 97.3 °F (36.3 °C) (Temporal)   01/08/24 (!) 97.1 °F (36.2 °C) (Tympanic)   01/08/24 (!) 97.4 °F (36.3 °C) (Oral)     BP Readings from Last 3 Encounters:   06/05/24 112/66   01/17/24 123/86   01/08/24 124/98     Pulse  Readings from Last 3 Encounters:   06/05/24 60   01/17/24 66   01/08/24 70          PHYSICAL EXAM:      Physical Exam  Vitals reviewed.   Constitutional:       General: He is not in acute distress.     Appearance: He is well-developed.   HENT:      Head: Normocephalic and atraumatic.   Eyes:      Conjunctiva/sclera: Conjunctivae normal.   Cardiovascular:      Rate and Rhythm: Normal rate and regular rhythm.      Heart sounds: No murmur heard.  Pulmonary:      Effort: Pulmonary effort is normal. No respiratory distress.      Breath sounds: Normal breath sounds.   Abdominal:      General: Bowel sounds are normal.      Palpations: Abdomen is soft.      Tenderness: There is no abdominal tenderness.   Musculoskeletal:         General: No swelling or tenderness.      Cervical back: Neck supple.   Skin:     General: Skin is warm and dry.      Capillary Refill: Capillary refill takes less than 2 seconds.   Neurological:      General: No focal deficit present.      Mental Status: He is alert and oriented to person, place, and time.   Psychiatric:         Mood and Affect: Mood normal.         Behavior: Behavior normal.        Lab Results:   No visits with results within 1 Day(s) from this visit.   Latest known visit with results is:   Lab on 05/22/2024   Component Date Value    Miscellaneous Lab Test R* 05/22/2024 Clostridium difficile Toxin/GDH W/Refl To PCR     Salmonella sp PCR 05/22/2024 Negative     Shigella sp/Enteroinvasi* 05/22/2024 Negative     Campylobacter sp (jejuni* 05/22/2024 Negative     Shiga toxin 1/Shiga toxi* 05/22/2024 Negative      Per chart review patient noted to be positive for C. difficile 5/22/2024, this was treated with vancomycin for 2 weeks.      Lab Results   Component Value Date    WBC 9.39 01/08/2024    HGB 14.4 01/08/2024    HCT 41.9 01/08/2024    MCV 85 01/08/2024     01/08/2024       Lab Results   Component Value Date    SODIUM 138 01/08/2024    K 3.8 01/08/2024     01/08/2024     CO2 28 01/08/2024    AGAP 9 01/08/2024    BUN 20 01/08/2024    CREATININE 1.07 01/08/2024    GLUC 97 01/08/2024    CALCIUM 10.0 01/08/2024    AST 19 05/20/2024    ALT 6 (L) 05/20/2024    ALKPHOS 73 05/20/2024    TP 7.9 05/20/2024    TBILI 0.82 05/20/2024    EGFR 99 01/08/2024     Lab Results   Component Value Date    HEPBCAB Non-reactive 05/20/2024    HEPCAB Non-reactive 05/20/2024       Lab Results   Component Value Date    OBC6JGBGJNIL 0.235 (L) 10/24/2023       Patient had a celiac panel done in 2020 which was negative, this was reviewed    Prior Procedure Results/Reports   Patient underwent EGD in February 2022 which showed some erythema in the gastric antrum, otherwise completely normal.  Gastric biopsies were positive for H. pylori at that time and it was recommended to be treated with quadruple therapy antibiotics.  Per chart review he did not finish these.  Lower esophageal biopsy showed evidence of moderate chronic inflammation, negative for Blackmon's esophagus.    Carley Simons PA-C   Available on [employer]

## 2024-06-05 NOTE — PATIENT INSTRUCTIONS
Scheduled date of colonoscopy (as of today): 07/10/24  Physician performing colonoscopy: Dr. Brown  Location of colonoscopy: [place]  Bowel prep reviewed with patient: Golytely  Instructions reviewed with patient by: Hilda  Clearances: none

## 2024-06-25 ENCOUNTER — ANESTHESIA EVENT (OUTPATIENT)
Dept: ANESTHESIOLOGY | Facility: HOSPITAL | Age: 19
End: 2024-06-25

## 2024-06-25 ENCOUNTER — ANESTHESIA (OUTPATIENT)
Dept: ANESTHESIOLOGY | Facility: HOSPITAL | Age: 19
End: 2024-06-25

## 2024-07-16 ENCOUNTER — TELEPHONE (OUTPATIENT)
Dept: GASTROENTEROLOGY | Facility: CLINIC | Age: 19
End: 2024-07-16

## 2025-03-10 ENCOUNTER — OFFICE VISIT (OUTPATIENT)
Dept: FAMILY MEDICINE CLINIC | Facility: CLINIC | Age: 20
End: 2025-03-10
Payer: COMMERCIAL

## 2025-03-10 VITALS
TEMPERATURE: 98.1 F | BODY MASS INDEX: 21.9 KG/M2 | HEIGHT: 70 IN | DIASTOLIC BLOOD PRESSURE: 70 MMHG | SYSTOLIC BLOOD PRESSURE: 110 MMHG | RESPIRATION RATE: 17 BRPM | OXYGEN SATURATION: 98 % | WEIGHT: 153 LBS | HEART RATE: 77 BPM

## 2025-03-10 DIAGNOSIS — F41.9 ANXIETY AND DEPRESSION: ICD-10-CM

## 2025-03-10 DIAGNOSIS — F32.A ANXIETY AND DEPRESSION: ICD-10-CM

## 2025-03-10 DIAGNOSIS — F32.2 SEVERE MAJOR DEPRESSIVE DISORDER (HCC): Primary | ICD-10-CM

## 2025-03-10 PROCEDURE — 99214 OFFICE O/P EST MOD 30 MIN: CPT | Performed by: FAMILY MEDICINE

## 2025-03-10 RX ORDER — VENLAFAXINE HYDROCHLORIDE 37.5 MG/1
37.5 CAPSULE, EXTENDED RELEASE ORAL
Qty: 30 CAPSULE | Refills: 5 | Status: SHIPPED | OUTPATIENT
Start: 2025-03-10

## 2025-03-10 NOTE — ASSESSMENT & PLAN NOTE
Depression Screening Follow-up Plan: Patient's depression screening was positive with a PHQ-2 score of 4. Their PHQ-9 score was 9. Patient assessed for underlying major depression. They have no active suicidal ideations. Brief counseling provided and recommend additional follow-up/re-evaluation next office visit.    Orders:  •  venlafaxine (EFFEXOR-XR) 37.5 mg 24 hr capsule; Take 1 capsule (37.5 mg total) by mouth daily with breakfast

## 2025-03-10 NOTE — PROGRESS NOTES
"Name: Jim Lorenzo      : 2005      MRN: [id number]  Encounter Provider: Rosamaria Ohara MD  Encounter Date: 3/10/2025   Encounter department: [facility]  :  Assessment & Plan  Severe major depressive disorder (HCC)  Depression Screening Follow-up Plan: Patient's depression screening was positive with a PHQ-2 score of 4. Their PHQ-9 score was 9. Patient assessed for underlying major depression. They have no active suicidal ideations. Brief counseling provided and recommend additional follow-up/re-evaluation next office visit.  Trial of effexor   Recheck in 1 month  Orders:  •  venlafaxine (EFFEXOR-XR) 37.5 mg 24 hr capsule; Take 1 capsule (37.5 mg total) by mouth daily with breakfast    Anxiety and depression  Depression Screening Follow-up Plan: Patient's depression screening was positive with a PHQ-2 score of 4. Their PHQ-9 score was 9. Patient assessed for underlying major depression. They have no active suicidal ideations. Brief counseling provided and recommend additional follow-up/re-evaluation next office visit.    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    HENT: Negative.     Respiratory: Negative.     Cardiovascular: Negative.    Endocrine: Negative.    Psychiatric/Behavioral:  Positive for dysphoric mood. The patient is nervous/anxious.        Objective   /70 (BP Location: Left arm, Patient Position: Sitting, Cuff Size: Standard)   Pulse 77   Temp 98.1 °F (36.7 °C) (Tympanic)   Resp 17   Ht 5' 9.5\" (1.765 m)   Wt 69.4 kg (153 " lb)   SpO2 98%   BMI 22.27 kg/m²      Physical Exam  Vitals and nursing note reviewed.   Constitutional:       Appearance: Normal appearance.   Cardiovascular:      Rate and Rhythm: Normal rate and regular rhythm.      Pulses: Normal pulses.      Heart sounds: Normal heart sounds.   Pulmonary:      Effort: Pulmonary effort is normal.      Breath sounds: Normal breath sounds.   Neurological:      Mental Status: He is alert.   Psychiatric:         Mood and Affect: Mood is anxious and depressed. Affect is not tearful.         Thought Content: Thought content normal.       Administrative Statements   I have spent a total time of 25 minutes in caring for this patient on the day of the visit/encounter including Risks and benefits of tx options, Instructions for management, Importance of tx compliance, Counseling / Coordination of care, and Reviewing/placing orders in the medical record (including tests, medications, and/or procedures).

## 2025-03-10 NOTE — ASSESSMENT & PLAN NOTE
Depression Screening Follow-up Plan: Patient's depression screening was positive with a PHQ-2 score of 4. Their PHQ-9 score was 9. Patient assessed for underlying major depression. They have no active suicidal ideations. Brief counseling provided and recommend additional follow-up/re-evaluation next office visit.  Trial of effexor   Recheck in 1 month  Orders:  •  venlafaxine (EFFEXOR-XR) 37.5 mg 24 hr capsule; Take 1 capsule (37.5 mg total) by mouth daily with breakfast

## 2025-04-09 ENCOUNTER — RA CDI HCC (OUTPATIENT)
Dept: OTHER | Facility: HOSPITAL | Age: 20
End: 2025-04-09

## 2025-04-09 NOTE — PROGRESS NOTES
HCC coding opportunities       Chart reviewed, no opportunity found: CHART REVIEWED, NO OPPORTUNITY FOUND      This is a reminder to address (resolve/update/assess) ALL HCC (risk adjustment) codes as found on active problem list for 2025 as patient scores reset to zero MANE.  Patients Insurance        Commercial Insurance: [employer] Commercial [employer]

## 2025-05-20 ENCOUNTER — OFFICE VISIT (OUTPATIENT)
Dept: FAMILY MEDICINE CLINIC | Facility: CLINIC | Age: 20
End: 2025-05-20
Payer: COMMERCIAL

## 2025-05-20 VITALS
HEIGHT: 70 IN | SYSTOLIC BLOOD PRESSURE: 110 MMHG | OXYGEN SATURATION: 98 % | HEART RATE: 82 BPM | RESPIRATION RATE: 17 BRPM | TEMPERATURE: 97.7 F | WEIGHT: 157.8 LBS | DIASTOLIC BLOOD PRESSURE: 70 MMHG | BODY MASS INDEX: 22.59 KG/M2

## 2025-05-20 DIAGNOSIS — F32.2 SEVERE MAJOR DEPRESSIVE DISORDER (HCC): Primary | ICD-10-CM

## 2025-05-20 DIAGNOSIS — F32.A ANXIETY AND DEPRESSION: ICD-10-CM

## 2025-05-20 DIAGNOSIS — F41.9 ANXIETY AND DEPRESSION: ICD-10-CM

## 2025-05-20 PROCEDURE — 99214 OFFICE O/P EST MOD 30 MIN: CPT | Performed by: FAMILY MEDICINE

## 2025-05-20 RX ORDER — VENLAFAXINE HYDROCHLORIDE 37.5 MG/1
37.5 CAPSULE, EXTENDED RELEASE ORAL
Qty: 90 CAPSULE | Refills: 3 | Status: SHIPPED | OUTPATIENT
Start: 2025-05-20

## 2025-05-20 NOTE — PROGRESS NOTES
Name: Jim Lorenzo      : 2005      MRN: [id number]  Encounter Provider: Rosamaria Ohara MD  Encounter Date: 2025   Encounter department: [facility]  :  Assessment & Plan  Severe major depressive disorder (HCC)  PHQ-2/9 Depression Screening    Little interest or pleasure in doing things: 1 - several days  Feeling down, depressed, or hopeless: 0 - not at all  Trouble falling or staying asleep, or sleeping too much: 0 - not at all  Feeling tired or having little energy: 1 - several days  Poor appetite or overeatin - not at all  Feeling bad about yourself - or that you are a failure or have let yourself or your family down: 1 - several days  Trouble concentrating on things, such as reading the newspaper or watching television: 0 - not at all  Moving or speaking so slowly that other people could have noticed. Or the opposite - being so fidgety or restless that you have been moving around a lot more than usual: 0 - not at all  Thoughts that you would be better off dead, or of hurting yourself in some way: 0 - not at all  PHQ-9 Score: 3  PHQ-9 Interpretation: No or Minimal depression      Stable on effexor  Orders:  •  venlafaxine (EFFEXOR-XR) 37.5 mg 24 hr capsule; Take 1 capsule (37.5 mg total) by mouth daily with breakfast    Anxiety and depression  YADIRA-7 Flowsheet Screening    Flowsheet Row Most Recent Value   Over the last two weeks, how often have you been bothered by the following problems?     Feeling nervous, anxious, or on edge 0   Not being able to stop or control worrying 1   Worrying too much about different things 0   Trouble relaxing  0   Being so restless that it's hard to sit still 1   Becoming easily annoyed or irritable  0   Feeling afraid as if something awful might happen 0   How difficult have these problems made it for you to do your work, take care of things at home, or get along with other people?  Not difficult at all   YADIRA Score  2       Improving  "  Continue current meds     Orders:  •  venlafaxine (EFFEXOR-XR) 37.5 mg 24 hr capsule; Take 1 capsule (37.5 mg total) by mouth daily with breakfast           History of Present Illness   HPI  Here for follow up  Feels well overall   Less anxious on effexor  Review of Systems   Constitutional: Negative.    HENT: Negative.     Eyes: Negative.    Respiratory: Negative.     Cardiovascular: Negative.    Allergic/Immunologic: Negative.    Neurological: Negative.    Psychiatric/Behavioral: Negative.         Objective   /70 (BP Location: Left arm, Patient Position: Sitting, Cuff Size: Standard)   Pulse 82   Temp 97.7 °F (36.5 °C) (Tympanic)   Resp 17   Ht 5' 9.5\" (1.765 m)   Wt 71.6 kg (157 lb 12.8 oz)   SpO2 98%   BMI 22.97 kg/m²      Physical Exam  Vitals and nursing note reviewed.   Constitutional:       Appearance: Normal appearance.     Cardiovascular:      Rate and Rhythm: Normal rate and regular rhythm.      Pulses: Normal pulses.      Heart sounds: Normal heart sounds.     Neurological:      General: No focal deficit present.      Mental Status: He is alert and oriented to person, place, and time.     Psychiatric:         Mood and Affect: Mood normal.         Behavior: Behavior normal.       Administrative Statements   I have spent a total time of 25 minutes in caring for this patient on the day of the visit/encounter including Instructions for management, Patient and family education, Importance of tx compliance, Risk factor reductions, Counseling / Coordination of care, and Reviewing/placing orders in the medical record (including tests, medications, and/or procedures).  "

## 2025-05-20 NOTE — ASSESSMENT & PLAN NOTE
YADIRA-7 Flowsheet Screening    Flowsheet Row Most Recent Value   Over the last two weeks, how often have you been bothered by the following problems?     Feeling nervous, anxious, or on edge 0   Not being able to stop or control worrying 1   Worrying too much about different things 0   Trouble relaxing  0   Being so restless that it's hard to sit still 1   Becoming easily annoyed or irritable  0   Feeling afraid as if something awful might happen 0   How difficult have these problems made it for you to do your work, take care of things at home, or get along with other people?  Not difficult at all   YADIRA Score  2       Improving   Continue current meds     Orders:  •  venlafaxine (EFFEXOR-XR) 37.5 mg 24 hr capsule; Take 1 capsule (37.5 mg total) by mouth daily with breakfast

## 2025-05-20 NOTE — ASSESSMENT & PLAN NOTE
PHQ-2/9 Depression Screening    Little interest or pleasure in doing things: 1 - several days  Feeling down, depressed, or hopeless: 0 - not at all  Trouble falling or staying asleep, or sleeping too much: 0 - not at all  Feeling tired or having little energy: 1 - several days  Poor appetite or overeatin - not at all  Feeling bad about yourself - or that you are a failure or have let yourself or your family down: 1 - several days  Trouble concentrating on things, such as reading the newspaper or watching television: 0 - not at all  Moving or speaking so slowly that other people could have noticed. Or the opposite - being so fidgety or restless that you have been moving around a lot more than usual: 0 - not at all  Thoughts that you would be better off dead, or of hurting yourself in some way: 0 - not at all  PHQ-9 Score: 3  PHQ-9 Interpretation: No or Minimal depression      Stable on effexor  Orders:  •  venlafaxine (EFFEXOR-XR) 37.5 mg 24 hr capsule; Take 1 capsule (37.5 mg total) by mouth daily with breakfast